# Patient Record
Sex: FEMALE | Race: WHITE | NOT HISPANIC OR LATINO | Employment: FULL TIME | URBAN - METROPOLITAN AREA
[De-identification: names, ages, dates, MRNs, and addresses within clinical notes are randomized per-mention and may not be internally consistent; named-entity substitution may affect disease eponyms.]

---

## 2019-12-09 ENCOUNTER — TRANSCRIBE ORDERS (OUTPATIENT)
Dept: LAB | Facility: HOSPITAL | Age: 29
End: 2019-12-09

## 2019-12-09 ENCOUNTER — LAB (OUTPATIENT)
Dept: LAB | Facility: HOSPITAL | Age: 29
End: 2019-12-09

## 2019-12-09 DIAGNOSIS — B27.90 INFECTIOUS MONONUCLEOSIS WITHOUT COMPLICATION, INFECTIOUS MONONUCLEOSIS DUE TO UNSPECIFIED ORGANISM: Primary | ICD-10-CM

## 2019-12-09 DIAGNOSIS — B27.90 INFECTIOUS MONONUCLEOSIS WITHOUT COMPLICATION, INFECTIOUS MONONUCLEOSIS DUE TO UNSPECIFIED ORGANISM: ICD-10-CM

## 2019-12-09 LAB
DEPRECATED RDW RBC AUTO: 42.4 FL (ref 37–54)
ERYTHROCYTE [DISTWIDTH] IN BLOOD BY AUTOMATED COUNT: 12.6 % (ref 12.3–15.4)
HCT VFR BLD AUTO: 37.7 % (ref 34–46.6)
HETEROPH AB SER QL LA: NEGATIVE
HGB BLD-MCNC: 13 G/DL (ref 12–15.9)
MCH RBC QN AUTO: 31.8 PG (ref 26.6–33)
MCHC RBC AUTO-ENTMCNC: 34.5 G/DL (ref 31.5–35.7)
MCV RBC AUTO: 92.2 FL (ref 79–97)
PLATELET # BLD AUTO: 257 10*3/MM3 (ref 140–450)
PMV BLD AUTO: 11.2 FL (ref 6–12)
RBC # BLD AUTO: 4.09 10*6/MM3 (ref 3.77–5.28)
WBC NRBC COR # BLD: 6.41 10*3/MM3 (ref 3.4–10.8)

## 2019-12-09 PROCEDURE — 36415 COLL VENOUS BLD VENIPUNCTURE: CPT | Performed by: OBSTETRICS & GYNECOLOGY

## 2019-12-09 PROCEDURE — 86308 HETEROPHILE ANTIBODY SCREEN: CPT

## 2019-12-09 PROCEDURE — 85027 COMPLETE CBC AUTOMATED: CPT | Performed by: OBSTETRICS & GYNECOLOGY

## 2021-07-06 ENCOUNTER — TRANSCRIBE ORDERS (OUTPATIENT)
Dept: LAB | Facility: HOSPITAL | Age: 31
End: 2021-07-06

## 2021-07-06 ENCOUNTER — LAB (OUTPATIENT)
Dept: LAB | Facility: HOSPITAL | Age: 31
End: 2021-07-06

## 2021-07-06 DIAGNOSIS — Z00.00 ROUTINE GENERAL MEDICAL EXAMINATION AT A HEALTH CARE FACILITY: ICD-10-CM

## 2021-07-06 DIAGNOSIS — Z00.00 ROUTINE GENERAL MEDICAL EXAMINATION AT A HEALTH CARE FACILITY: Primary | ICD-10-CM

## 2021-07-06 PROCEDURE — 86765 RUBEOLA ANTIBODY: CPT

## 2021-07-06 PROCEDURE — 86735 MUMPS ANTIBODY: CPT

## 2021-07-06 PROCEDURE — 86787 VARICELLA-ZOSTER ANTIBODY: CPT

## 2021-07-06 PROCEDURE — 86762 RUBELLA ANTIBODY: CPT

## 2021-07-06 PROCEDURE — 36415 COLL VENOUS BLD VENIPUNCTURE: CPT

## 2021-07-07 LAB
MEV IGG SER IA-ACNC: >300 AU/ML
MUV IGG SER IA-ACNC: 74.7 AU/ML
RUBV IGG SERPL IA-ACNC: 1.07 INDEX
VZV IGG SER IA-ACNC: <135 INDEX
VZV IGM SER IA-ACNC: <0.91 INDEX (ref 0–0.9)

## 2022-12-19 ENCOUNTER — LAB (OUTPATIENT)
Dept: LAB | Facility: HOSPITAL | Age: 32
End: 2022-12-19

## 2022-12-19 ENCOUNTER — TRANSCRIBE ORDERS (OUTPATIENT)
Dept: LAB | Facility: HOSPITAL | Age: 32
End: 2022-12-19

## 2022-12-19 DIAGNOSIS — Z00.00 ROUTINE GENERAL MEDICAL EXAMINATION AT A HEALTH CARE FACILITY: Primary | ICD-10-CM

## 2022-12-19 DIAGNOSIS — Z00.00 ROUTINE GENERAL MEDICAL EXAMINATION AT A HEALTH CARE FACILITY: ICD-10-CM

## 2022-12-19 LAB
CHOLEST SERPL-MCNC: 166 MG/DL (ref 0–200)
HBA1C MFR BLD: 5.4 % (ref 4.8–5.6)
HDLC SERPL QL: 3.25
HDLC SERPL-MCNC: 51 MG/DL (ref 40–60)
LDLC SERPL CALC-MCNC: 98 MG/DL (ref 0–100)
TRIGL SERPL-MCNC: 93 MG/DL (ref 0–150)
VLDLC SERPL-MCNC: 17 MG/DL (ref 5–40)

## 2022-12-19 PROCEDURE — 80061 LIPID PANEL: CPT

## 2022-12-19 PROCEDURE — 80050 GENERAL HEALTH PANEL: CPT

## 2022-12-19 PROCEDURE — 36415 COLL VENOUS BLD VENIPUNCTURE: CPT

## 2022-12-19 PROCEDURE — 83036 HEMOGLOBIN GLYCOSYLATED A1C: CPT

## 2022-12-20 LAB
ALBUMIN SERPL-MCNC: 4.5 G/DL (ref 3.5–5.2)
ALBUMIN/GLOB SERPL: 1.7 G/DL
ALP SERPL-CCNC: 58 U/L (ref 39–117)
ALT SERPL W P-5'-P-CCNC: 11 U/L (ref 1–33)
ANION GAP SERPL CALCULATED.3IONS-SCNC: 8.5 MMOL/L (ref 5–15)
AST SERPL-CCNC: 13 U/L (ref 1–32)
BILIRUB SERPL-MCNC: 0.5 MG/DL (ref 0–1.2)
BUN SERPL-MCNC: 7 MG/DL (ref 6–20)
BUN/CREAT SERPL: 8.4 (ref 7–25)
CALCIUM SPEC-SCNC: 9.9 MG/DL (ref 8.6–10.5)
CHLORIDE SERPL-SCNC: 103 MMOL/L (ref 98–107)
CO2 SERPL-SCNC: 25.5 MMOL/L (ref 22–29)
CREAT SERPL-MCNC: 0.83 MG/DL (ref 0.57–1)
DEPRECATED RDW RBC AUTO: 41.7 FL (ref 37–54)
EGFRCR SERPLBLD CKD-EPI 2021: 96.2 ML/MIN/1.73
ERYTHROCYTE [DISTWIDTH] IN BLOOD BY AUTOMATED COUNT: 12.3 % (ref 12.3–15.4)
GLOBULIN UR ELPH-MCNC: 2.6 GM/DL
GLUCOSE SERPL-MCNC: 77 MG/DL (ref 65–99)
HCT VFR BLD AUTO: 41 % (ref 34–46.6)
HGB BLD-MCNC: 13.8 G/DL (ref 12–15.9)
MCH RBC QN AUTO: 31 PG (ref 26.6–33)
MCHC RBC AUTO-ENTMCNC: 33.7 G/DL (ref 31.5–35.7)
MCV RBC AUTO: 92.1 FL (ref 79–97)
PLATELET # BLD AUTO: 328 10*3/MM3 (ref 140–450)
PMV BLD AUTO: 10.5 FL (ref 6–12)
POTASSIUM SERPL-SCNC: 4.6 MMOL/L (ref 3.5–5.2)
PROT SERPL-MCNC: 7.1 G/DL (ref 6–8.5)
RBC # BLD AUTO: 4.45 10*6/MM3 (ref 3.77–5.28)
SODIUM SERPL-SCNC: 137 MMOL/L (ref 136–145)
TSH SERPL DL<=0.05 MIU/L-ACNC: 1.09 UIU/ML (ref 0.27–4.2)
WBC NRBC COR # BLD: 8.16 10*3/MM3 (ref 3.4–10.8)

## 2024-12-05 ENCOUNTER — LAB (OUTPATIENT)
Dept: LAB | Facility: HOSPITAL | Age: 34
End: 2024-12-05
Payer: COMMERCIAL

## 2024-12-05 ENCOUNTER — OFFICE VISIT (OUTPATIENT)
Dept: FAMILY MEDICINE CLINIC | Facility: CLINIC | Age: 34
End: 2024-12-05
Payer: COMMERCIAL

## 2024-12-05 VITALS
WEIGHT: 243.8 LBS | DIASTOLIC BLOOD PRESSURE: 82 MMHG | BODY MASS INDEX: 38.27 KG/M2 | OXYGEN SATURATION: 97 % | HEIGHT: 67 IN | HEART RATE: 78 BPM | SYSTOLIC BLOOD PRESSURE: 130 MMHG

## 2024-12-05 DIAGNOSIS — E66.812 CLASS 2 OBESITY WITHOUT SERIOUS COMORBIDITY WITH BODY MASS INDEX (BMI) OF 38.0 TO 38.9 IN ADULT, UNSPECIFIED OBESITY TYPE: ICD-10-CM

## 2024-12-05 DIAGNOSIS — G47.9 SLEEPING DIFFICULTY: ICD-10-CM

## 2024-12-05 DIAGNOSIS — Z00.00 ANNUAL PHYSICAL EXAM: Primary | ICD-10-CM

## 2024-12-05 DIAGNOSIS — Z13.220 SCREENING FOR CHOLESTEROL LEVEL: ICD-10-CM

## 2024-12-05 DIAGNOSIS — Z13.1 SCREENING FOR DIABETES MELLITUS: ICD-10-CM

## 2024-12-05 DIAGNOSIS — F31.9 BIPOLAR 1 DISORDER: ICD-10-CM

## 2024-12-05 DIAGNOSIS — Z00.00 ANNUAL PHYSICAL EXAM: ICD-10-CM

## 2024-12-05 LAB
DEPRECATED RDW RBC AUTO: 38.4 FL (ref 37–54)
ERYTHROCYTE [DISTWIDTH] IN BLOOD BY AUTOMATED COUNT: 11.8 % (ref 12.3–15.4)
HCT VFR BLD AUTO: 39.2 % (ref 34–46.6)
HGB BLD-MCNC: 13.3 G/DL (ref 12–15.9)
MCH RBC QN AUTO: 30.6 PG (ref 26.6–33)
MCHC RBC AUTO-ENTMCNC: 33.9 G/DL (ref 31.5–35.7)
MCV RBC AUTO: 90.1 FL (ref 79–97)
PLATELET # BLD AUTO: 426 10*3/MM3 (ref 140–450)
PMV BLD AUTO: 10.1 FL (ref 6–12)
RBC # BLD AUTO: 4.35 10*6/MM3 (ref 3.77–5.28)
WBC NRBC COR # BLD AUTO: 9.04 10*3/MM3 (ref 3.4–10.8)

## 2024-12-05 PROCEDURE — 99385 PREV VISIT NEW AGE 18-39: CPT | Performed by: STUDENT IN AN ORGANIZED HEALTH CARE EDUCATION/TRAINING PROGRAM

## 2024-12-05 PROCEDURE — 80053 COMPREHEN METABOLIC PANEL: CPT

## 2024-12-05 PROCEDURE — 80061 LIPID PANEL: CPT

## 2024-12-05 PROCEDURE — 85027 COMPLETE CBC AUTOMATED: CPT

## 2024-12-05 PROCEDURE — 84443 ASSAY THYROID STIM HORMONE: CPT

## 2024-12-05 PROCEDURE — 83036 HEMOGLOBIN GLYCOSYLATED A1C: CPT

## 2024-12-05 RX ORDER — LISDEXAMFETAMINE DIMESYLATE 50 MG/1
50 CAPSULE ORAL
COMMUNITY
Start: 2024-10-24

## 2024-12-05 RX ORDER — VALACYCLOVIR HYDROCHLORIDE 500 MG/1
500 TABLET, FILM COATED ORAL
COMMUNITY

## 2024-12-05 RX ORDER — LITHIUM CARBONATE 300 MG
300 TABLET ORAL
COMMUNITY
Start: 2024-11-08

## 2024-12-05 RX ORDER — NORETHINDRONE 0.35 MG/1
1 TABLET ORAL DAILY
COMMUNITY

## 2024-12-05 RX ORDER — BUPROPION HYDROCHLORIDE 300 MG/1
TABLET ORAL
COMMUNITY
Start: 2024-11-15

## 2024-12-05 RX ORDER — HYDROXYZINE HYDROCHLORIDE 25 MG/1
50 TABLET, FILM COATED ORAL EVERY 24 HOURS
COMMUNITY
End: 2024-12-05

## 2024-12-05 RX ORDER — LITHIUM CARBONATE 150 MG/1
150 CAPSULE ORAL
COMMUNITY
Start: 2024-11-08

## 2024-12-05 NOTE — PROGRESS NOTES
Physical Exam     Patient Name: Darrius Katz  : 1990   MRN: [id number]   Care Team: Patient Care Team:  Taylor Cope DO as PCP - General (Internal Medicine)    Chief Complaint:    Chief Complaint   Patient presents with    Annual Exam       History of Present Illness: Darrius Katz is a 34 y.o. female who is presents today for annual healthcare maintenance. She is a pharmacy tech. She is in school, studying psychology.    Bipolar disorder - Following with Rebecca Brunner for behavioral health needs. Compliant with wellbutrin, lithium.    ADHD - Vyvanse 50mg daily, also managed by behavioral health    HSV1 - taking valtrex prn     Obesity - reports 65lb weight gain over the past year. She attributes this to last years decline in mental health, lack of exercise. She feels like her diet is up and down. She enjoys healthy eating but feels she eats many meal out of convenience lately.     Depression: PHQ-2 Depression Screening  PHQ-2 Depression Screening  Little interest or pleasure in doing things? Not at all   Feeling down, depressed, or hopeless? Not at all   PHQ-2 Total Score 0      Family History  Mother - no known medical history  Father - no known medical history  Maternal grandmother - breast cancer  Maternal grandfather - lung cancer       Health Maintenance   Topic Date Due    COVID-19 Vaccine (2024-25 season) 2024 (Originally 2024)    INFLUENZA VACCINE  2025 (Originally 2024)    ANNUAL PHYSICAL  2025    BMI FOLLOWUP  2025    PAP SMEAR  2026    TDAP/TD VACCINES (3 - Td or Tdap) 2031    HEPATITIS C SCREENING  Completed    Pneumococcal Vaccine 0-64  Aged Out       Subjective      Review of Systems:   Review of Systems - See HPI    Past Medical History: History reviewed. No pertinent past medical history.    Past Surgical History: History reviewed. No pertinent surgical history.    Family History: History reviewed. No pertinent family  "history.    Social History:   Social History     Socioeconomic History    Marital status: Single   Tobacco Use    Smoking status: Never    Smokeless tobacco: Never   Vaping Use    Vaping status: Never Used   Substance and Sexual Activity    Alcohol use: Yes    Drug use: Never    Sexual activity: Defer       Tobacco History:   Social History     Tobacco Use   Smoking Status Never   Smokeless Tobacco Never       Medications:     Current Outpatient Medications:     buPROPion XL (WELLBUTRIN XL) 300 MG 24 hr tablet, , Disp: , Rfl:     Angy 0.35 MG tablet, Take 1 tablet by mouth Daily., Disp: , Rfl:     lisdexamfetamine (VYVANSE) 50 MG capsule, Take 1 capsule by mouth, Disp: , Rfl:     lithium 300 MG tablet, Take 1 tablet by mouth., Disp: , Rfl:     lithium carbonate 150 MG capsule, 1 capsule., Disp: , Rfl:     valACYclovir (VALTREX) 500 MG tablet, 1 tablet., Disp: , Rfl:     Allergies:   Allergies   Allergen Reactions    Sulfa Antibiotics Rash       Past Medical History, Social History, Family History and Care Team were all reviewed with patient and updated as appropriate.       Objective     Physical Exam:  Vital Signs:   Vitals:    12/05/24 1245   BP: 130/82   Pulse: 78   SpO2: 97%   Weight: 111 kg (243 lb 12.8 oz)   Height: 170.2 cm (67\")     Body mass index is 38.18 kg/m².     Physical Exam  Vitals reviewed.   Constitutional:       Appearance: Normal appearance. She is not ill-appearing.   Cardiovascular:      Rate and Rhythm: Normal rate and regular rhythm.      Heart sounds: Normal heart sounds. No murmur heard.  Pulmonary:      Effort: Pulmonary effort is normal. No respiratory distress.      Breath sounds: Normal breath sounds. No wheezing.   Abdominal:      General: Abdomen is flat. There is no distension.      Palpations: Abdomen is soft.      Tenderness: There is no abdominal tenderness.   Skin:     General: Skin is warm and dry.   Neurological:      Mental Status: She is alert.   Psychiatric:         Mood " and Affect: Mood normal.         Behavior: Behavior normal.         Judgment: Judgment normal.         Assessment / Plan      Assessment/Plan:   Problems Addressed This Visit  Diagnoses and all orders for this visit:    1. Annual physical exam (Primary)  -     CBC (No Diff); Future  -     Lipid Panel; Future  -     Hemoglobin A1c; Future  -     Comprehensive Metabolic Panel; Future  -     TSH; Future    2. Screening for diabetes mellitus  -     Hemoglobin A1c; Future    3. Screening for cholesterol level  -     Lipid Panel; Future    4. Class 2 obesity without serious comorbidity with body mass index (BMI) of 38.0 to 38.9 in adult, unspecified obesity type  -     CBC (No Diff); Future  -     Lipid Panel; Future  -     Hemoglobin A1c; Future  -     Comprehensive Metabolic Panel; Future  -     TSH; Future    5. Sleeping difficulty    6. Bipolar 1 disorder  -     Ambulatory Referral to Behavioral Health        Healthcare Maintenance   Vaccines:  Declines covid and influenza    Cancer Screening  Pap - 12/2023 [facility] - follows with Dr. Evonne aMgaña       Other  -PHQ score   -Counseled on importance of maintaining healthy diet and routine exercise. Encouraged 150 min exercise per week.  -Tobacco cessation: not indicated, nonsmoker  -Sexual Health: OCP managed by GYN for contraception. Declines STD screening.   -Blood pressure is at goal <130/80  -Metabolic screening today      Encouraged routine eye and dental exams.     ADHD - managed through behavioral health, vyvanse  Bipolar - managed through behavioral health, wellbutrin and lithium  Anxiety/sleep difficulties - previously taking hydroxyzine which has not been helpful. She has trouble staying asleep. She is planning to follow up with her psychiatrist about switching her sleep medication, interested in trazodone. Interested in talk therapy and we will put in referral today.     Obesity - Counseled patient on importance of weight loss and maintaining healthy lifestyle.  Recommended calorie deficit and 150 minutes of exercise per week.      Plan of care reviewed with patient at the conclusion of today's visit. Education was provided regarding diagnosis and management.  Patient verbalizes understanding of and agreement with management plan.    Follow Up:   Return in about 6 weeks (around 1/16/2025) for Recheck obesity - in person or video, patient preference .        DO ANNY Corley RD  [facility]  [address]  [facility] [address]  Fax [phone]  Phone [phone]

## 2024-12-06 LAB
ALBUMIN SERPL-MCNC: 4.3 G/DL (ref 3.5–5.2)
ALBUMIN/GLOB SERPL: 1.7 G/DL
ALP SERPL-CCNC: 79 U/L (ref 39–117)
ALT SERPL W P-5'-P-CCNC: 8 U/L (ref 1–33)
ANION GAP SERPL CALCULATED.3IONS-SCNC: 8 MMOL/L (ref 5–15)
AST SERPL-CCNC: 15 U/L (ref 1–32)
BILIRUB SERPL-MCNC: 0.3 MG/DL (ref 0–1.2)
BUN SERPL-MCNC: 8 MG/DL (ref 6–20)
BUN/CREAT SERPL: 8.2 (ref 7–25)
CALCIUM SPEC-SCNC: 9.7 MG/DL (ref 8.6–10.5)
CHLORIDE SERPL-SCNC: 105 MMOL/L (ref 98–107)
CHOLEST SERPL-MCNC: 196 MG/DL (ref 0–200)
CO2 SERPL-SCNC: 23 MMOL/L (ref 22–29)
CREAT SERPL-MCNC: 0.97 MG/DL (ref 0.57–1)
EGFRCR SERPLBLD CKD-EPI 2021: 78.8 ML/MIN/1.73
GLOBULIN UR ELPH-MCNC: 2.6 GM/DL
GLUCOSE SERPL-MCNC: 67 MG/DL (ref 65–99)
HBA1C MFR BLD: 5.1 % (ref 4.8–5.6)
HDLC SERPL-MCNC: 41 MG/DL (ref 40–60)
LDLC SERPL CALC-MCNC: 145 MG/DL (ref 0–100)
LDLC/HDLC SERPL: 3.52 {RATIO}
POTASSIUM SERPL-SCNC: 4.7 MMOL/L (ref 3.5–5.2)
PROT SERPL-MCNC: 6.9 G/DL (ref 6–8.5)
SODIUM SERPL-SCNC: 136 MMOL/L (ref 136–145)
TRIGL SERPL-MCNC: 53 MG/DL (ref 0–150)
TSH SERPL DL<=0.05 MIU/L-ACNC: 2.95 UIU/ML (ref 0.27–4.2)
VLDLC SERPL-MCNC: 10 MG/DL (ref 5–40)

## 2024-12-13 ENCOUNTER — PATIENT ROUNDING (BHMG ONLY) (OUTPATIENT)
Dept: FAMILY MEDICINE CLINIC | Facility: CLINIC | Age: 34
End: 2024-12-13
Payer: COMMERCIAL

## 2025-01-16 ENCOUNTER — OFFICE VISIT (OUTPATIENT)
Dept: FAMILY MEDICINE CLINIC | Facility: CLINIC | Age: 35
End: 2025-01-16
Payer: COMMERCIAL

## 2025-01-16 VITALS
SYSTOLIC BLOOD PRESSURE: 134 MMHG | HEART RATE: 85 BPM | WEIGHT: 233.4 LBS | DIASTOLIC BLOOD PRESSURE: 86 MMHG | OXYGEN SATURATION: 95 % | HEIGHT: 67 IN | BODY MASS INDEX: 36.63 KG/M2

## 2025-01-16 DIAGNOSIS — M54.42 CHRONIC LEFT-SIDED LOW BACK PAIN WITH BILATERAL SCIATICA: ICD-10-CM

## 2025-01-16 DIAGNOSIS — G89.29 CHRONIC LEFT-SIDED LOW BACK PAIN WITH BILATERAL SCIATICA: ICD-10-CM

## 2025-01-16 DIAGNOSIS — M54.41 CHRONIC LEFT-SIDED LOW BACK PAIN WITH BILATERAL SCIATICA: ICD-10-CM

## 2025-01-16 DIAGNOSIS — E66.812 CLASS 2 OBESITY WITHOUT SERIOUS COMORBIDITY WITH BODY MASS INDEX (BMI) OF 38.0 TO 38.9 IN ADULT, UNSPECIFIED OBESITY TYPE: Primary | ICD-10-CM

## 2025-01-16 PROCEDURE — 99214 OFFICE O/P EST MOD 30 MIN: CPT | Performed by: STUDENT IN AN ORGANIZED HEALTH CARE EDUCATION/TRAINING PROGRAM

## 2025-01-16 RX ORDER — MELOXICAM 15 MG/1
15 TABLET ORAL DAILY PRN
Qty: 30 TABLET | Refills: 0 | Status: SHIPPED | OUTPATIENT
Start: 2025-01-16

## 2025-01-16 NOTE — PROGRESS NOTES
Established Office Visit      Patient Name: Darrius Katz  : 1990   MRN: [id number]   Care Team: Patient Care Team:  Taylor Cope DO as PCP - General (Internal Medicine)    Chief Complaint:    Chief Complaint   Patient presents with    Obesity       History of Present Illness: Darrius Katz is a 34 y.o. female who is here today to follow up with obesity.    Over the past 6 weeks she has focused on dietary changes - limiting sugar, increasing protein, trying to incorporate more lean meats. Has lost 10lb in the past month.    She is eager to start working out - she enjoys lifting weights.     She reports left sided low back pain with sciatica which has been flaring over the past week or so. Aleve is partially helpful. No specific trigger. She has felt similar pain in the past and feels it flare every now and then. Typically responsive to NSAIDs but hasn't has as much luck with OTC aleve this time. Interested in PT.    Subjective      Review of Systems:   Review of Systems - See HPI    I have reviewed and the following portions of the patient's history were updated as appropriate: past family history, past medical history, past social history, past surgical history and problem list.    Medications:     Current Outpatient Medications:     buPROPion XL (WELLBUTRIN XL) 300 MG 24 hr tablet, , Disp: , Rfl:     Angy 0.35 MG tablet, Take 1 tablet by mouth Daily., Disp: , Rfl:     lisdexamfetamine (VYVANSE) 50 MG capsule, Take 1 capsule by mouth, Disp: , Rfl:     lithium 300 MG tablet, Take 1 tablet by mouth., Disp: , Rfl:     lithium carbonate 150 MG capsule, 1 capsule., Disp: , Rfl:     valACYclovir (VALTREX) 500 MG tablet, 1 tablet., Disp: , Rfl:     meloxicam (MOBIC) 15 MG tablet, Take 1 tablet by mouth Daily As Needed for Mild Pain (low back pain with left sciatica)., Disp: 30 tablet, Rfl: 0    Allergies:   Allergies   Allergen Reactions    Sulfa Antibiotics Rash       Objective     Physical  "Exam:  Vital Signs:   Vitals:    01/16/25 0951   BP: 134/86   Pulse: 85   SpO2: 95%   Weight: 106 kg (233 lb 6.4 oz)   Height: 170.2 cm (67\")     Body mass index is 36.56 kg/m².     Physical Exam  Vitals reviewed.   Constitutional:       Appearance: Normal appearance. She is obese.   Cardiovascular:      Rate and Rhythm: Normal rate.   Pulmonary:      Effort: Pulmonary effort is normal. No respiratory distress.   Skin:     General: Skin is warm and dry.   Neurological:      Mental Status: She is alert.   Psychiatric:         Mood and Affect: Mood normal.         Behavior: Behavior normal.         Judgment: Judgment normal.         Assessment / Plan      Assessment/Plan:   Problems Addressed This Visit  Diagnoses and all orders for this visit:    1. Class 2 obesity without serious comorbidity with body mass index (BMI) of 38.0 to 38.9 in adult, unspecified obesity type (Primary)    2. Chronic left-sided low back pain with bilateral sciatica  -     meloxicam (MOBIC) 15 MG tablet; Take 1 tablet by mouth Daily As Needed for Mild Pain (low back pain with left sciatica).  Dispense: 30 tablet; Refill: 0  -     Ambulatory Referral to Physical Therapy for Evaluation & Treatment      Obesity - improving, has lost 10lb in the last month due to dietary changes - encouraged her to stay consistent with this and add goal of incorporating exercise into her routine.     Low back pain with left sided sciatica - trial of meloxicam and referred to PT. Will let me know if pain persists. Weight loss should also help prevent future flares as well.       Plan of care reviewed with patient at the conclusion of today's visit. Education was provided regarding diagnosis and management.  Patient verbalizes understanding of and agreement with management plan.    Follow Up:   Return in about 5 months (around 6/16/2025) for Recheck obesity and HLD .        DO ANNY Corley RD  [facility] " [facility]  [address] [facility] [place] [address]  Fax [phone]  Phone [phone]

## 2025-01-25 ENCOUNTER — TELEMEDICINE (OUTPATIENT)
Dept: FAMILY MEDICINE CLINIC | Facility: TELEHEALTH | Age: 35
End: 2025-01-25
Payer: COMMERCIAL

## 2025-01-25 DIAGNOSIS — J06.9 UPPER RESPIRATORY TRACT INFECTION, UNSPECIFIED TYPE: Primary | ICD-10-CM

## 2025-01-25 PROCEDURE — 99213 OFFICE O/P EST LOW 20 MIN: CPT | Performed by: NURSE PRACTITIONER

## 2025-01-25 RX ORDER — BROMPHENIRAMINE MALEATE, PSEUDOEPHEDRINE HYDROCHLORIDE, AND DEXTROMETHORPHAN HYDROBROMIDE 2; 30; 10 MG/5ML; MG/5ML; MG/5ML
5 SYRUP ORAL 3 TIMES DAILY PRN
Qty: 120 ML | Refills: 0 | Status: SHIPPED | OUTPATIENT
Start: 2025-01-25

## 2025-01-25 RX ORDER — HYDROXYZINE HYDROCHLORIDE 50 MG/1
TABLET, FILM COATED ORAL
COMMUNITY
Start: 2025-01-16

## 2025-01-25 RX ORDER — DEXTROMETHORPHAN HYDROBROMIDE AND PROMETHAZINE HYDROCHLORIDE 15; 6.25 MG/5ML; MG/5ML
5 SYRUP ORAL NIGHTLY PRN
Qty: 120 ML | Refills: 0 | Status: SHIPPED | OUTPATIENT
Start: 2025-01-25

## 2025-01-25 RX ORDER — CLONIDINE HYDROCHLORIDE 0.1 MG/1
TABLET ORAL
COMMUNITY
Start: 2025-01-19

## 2025-01-25 NOTE — PATIENT INSTRUCTIONS
Drink plenty of water  Over the counter pain relievers okay   If symptoms do not improve in 3-5 days follow up with your primary care provider or urgent care  If symptoms worsen follow up with urgent care or the emergency room      Upper Respiratory Infection, Adult  An upper respiratory infection (URI) is a common viral infection of the nose, throat, and upper air passages that lead to the lungs. The most common type of URI is the common cold. URIs usually get better on their own, without medical treatment.  What are the causes?  A URI is caused by a virus. You may catch a virus by:  Breathing in droplets from an infected person's cough or sneeze.  Touching something that has been exposed to the virus (is contaminated) and then touching your mouth, nose, or eyes.  What increases the risk?  You are more likely to get a URI if:  You are very young or very old.  You have close contact with others, such as at work, school, or a health care facility.  You smoke.  You have long-term (chronic) heart or lung disease.  You have a weakened disease-fighting system (immune system).  You have nasal allergies or asthma.  You are experiencing a lot of stress.  You have poor nutrition.  What are the signs or symptoms?  A URI usually involves some of the following symptoms:  Runny or stuffy (congested) nose.  Cough.  Sneezing.  Sore throat.  Headache.  Fatigue.  Fever.  Loss of appetite.  Pain in your forehead, behind your eyes, and over your cheekbones (sinus pain).  Muscle aches.  Redness or irritation of the eyes.  Pressure in the ears or face.  How is this diagnosed?  This condition may be diagnosed based on your medical history and symptoms, and a physical exam. Your health care provider may use a swab to take a mucus sample from your nose (nasal swab). This sample can be tested to determine what virus is causing the illness.  How is this treated?  URIs usually get better on their own within 7-10 days. Medicines cannot cure  URIs, but your health care provider may recommend certain medicines to help relieve symptoms, such as:  Over-the-counter cold medicines.  Cough suppressants. Coughing is a type of defense against infection that helps to clear the respiratory system, so take these medicines only as recommended by your health care provider.  Fever-reducing medicines.  Follow these instructions at home:  Activity  Rest as needed.  If you have a fever, stay home from work or school until your fever is gone or until your health care provider says your URI cannot spread to other people (is no longer contagious). Your health care provider may have you wear a face mask to prevent your infection from spreading.  Relieving symptoms  Gargle with a mixture of salt and water 3-4 times a day or as needed. To make salt water, completely dissolve ½-1 tsp (3-6 g) of salt in 1 cup (237 mL) of warm water.  Use a cool-mist humidifier to add moisture to the air. This can help you breathe more easily.  Eating and drinking    Drink enough fluid to keep your urine pale yellow.  Eat soups and other clear broths.  General instructions    Take over-the-counter and prescription medicines only as told by your health care provider. These include cold medicines, fever reducers, and cough suppressants.  Do not use any products that contain nicotine or tobacco. These products include cigarettes, chewing tobacco, and vaping devices, such as e-cigarettes. If you need help quitting, ask your health care provider.  Stay away from secondhand smoke.  Stay up to date on all immunizations, including the yearly (annual) flu vaccine.  Keep all follow-up visits. This is important.  How to prevent the spread of infection to others  URIs can be contagious. To prevent the infection from spreading:  Wash your hands with soap and water for at least 20 seconds. If soap and water are not available, use hand .  Avoid touching your mouth, face, eyes, or nose.  Cough or sneeze  into a tissue or your sleeve or elbow instead of into your hand or into the air.    Contact a health care provider if:  You are getting worse instead of better.  You have a fever or chills.  Your mucus is brown or red.  You have yellow or brown discharge coming from your nose.  You have pain in your face, especially when you bend forward.  You have swollen neck glands.  You have pain while swallowing.  You have white areas in the back of your throat.  Get help right away if:  You have shortness of breath that gets worse.  You have severe or persistent:  Headache.  Ear pain.  Sinus pain.  Chest pain.  You have chronic lung disease along with any of the following:  Making high-pitched whistling sounds when you breathe, most often when you breathe out (wheezing).  Prolonged cough (more than 14 days).  Coughing up blood.  A change in your usual mucus.  You have a stiff neck.  You have changes in your:  Vision.  Hearing.  Thinking.  Mood.  These symptoms may be an emergency. Get help right away. Call 911.  Do not wait to see if the symptoms will go away.  Do not drive yourself to the hospital.  Summary  An upper respiratory infection (URI) is a common infection of the nose, throat, and upper air passages that lead to the lungs.  A URI is caused by a virus.  URIs usually get better on their own within 7-10 days.  Medicines cannot cure URIs, but your health care provider may recommend certain medicines to help relieve symptoms.  This information is not intended to replace advice given to you by your health care provider. Make sure you discuss any questions you have with your health care provider.  Document Revised: 07/20/2022 Document Reviewed: 07/20/2022  Elsevier Patient Education © 2024 [employer]

## 2025-01-25 NOTE — PROGRESS NOTES
CHIEF COMPLAINT  Chief Complaint   Patient presents with    Sinusitis         HPI  Darrius Katz is a 34 y.o. female  presents with complaint of congestion, cough and fatigue that started on Thursday.   She has not had her flu vaccine.     Review of Systems   Constitutional:  Positive for fatigue. Negative for chills, diaphoresis and fever.   HENT:  Positive for congestion and rhinorrhea. Negative for postnasal drip, sinus pressure, sinus pain, sneezing and sore throat.    Respiratory:  Positive for cough. Negative for chest tightness, shortness of breath and wheezing.    Gastrointestinal:  Negative for diarrhea, nausea and vomiting.   Musculoskeletal:  Positive for myalgias.   Neurological:  Positive for headaches.       Past Medical History:   Diagnosis Date    ADHD (attention deficit hyperactivity disorder) 2006    Anxiety 2021    First psychotic episode    Depression 2024       Family History   Problem Relation Age of Onset    Cancer Maternal Grandfather     Alcohol abuse Paternal Grandfather     Diabetes Paternal Grandfather     Cancer Maternal Aunt        Social History     Socioeconomic History    Marital status: Single   Tobacco Use    Smoking status: Never     Passive exposure: Never    Smokeless tobacco: Never   Vaping Use    Vaping status: Never Used   Substance and Sexual Activity    Alcohol use: Yes    Drug use: Never    Sexual activity: Not Currently     Partners: Male     Birth control/protection: Birth control pill         LMP 12/26/2024 (Approximate)   Breastfeeding No     PHYSICAL EXAM  Physical Exam   Constitutional: She is oriented to person, place, and time. She appears well-developed and well-nourished. She does not have a sickly appearance. She does not appear ill. No distress.   HENT:   Head: Normocephalic and atraumatic.   Eyes: EOM are normal.   Neck: Neck normal appearance.  Pulmonary/Chest: Effort normal.  No respiratory distress.  Neurological: She is alert and oriented to person,  place, and time.   Skin: Skin is dry.   Psychiatric: She has a normal mood and affect.           Diagnoses and all orders for this visit:    1. Upper respiratory tract infection, unspecified type (Primary)    Other orders  -     brompheniramine-pseudoephedrine-DM 30-2-10 MG/5ML syrup; Take 5 mL by mouth 3 (Three) Times a Day As Needed for Congestion or Cough.  Dispense: 120 mL; Refill: 0  -     promethazine-dextromethorphan (PROMETHAZINE-DM) 6.25-15 MG/5ML syrup; Take 5 mL by mouth At Night As Needed for Cough.  Dispense: 120 mL; Refill: 0        Mode of visit: Video   Myself and Darrius Katz participated in this visit. The patient is located in [address] [place] [address]. I am located in [place], [place]. Mychart and Twilio were utilized.   You have chosen to receive care through a telehealth visit.     Does the patient consent to use a video/audio connection for your medical care today? Yes       Note Disclaimer: At [facility], we believe that sharing information builds trust and better   relationships. You are receiving this note because you recently visited [facility]. It is possible you   will see health information before a provider has talked with you about it. This kind of information can   be easy to misunderstand. To help you fully understand what it means for your health, we urge you to   discuss this note with your provider.    Ema Lamb, JONATHAN  01/25/2025  13:20 EST

## 2025-03-06 ENCOUNTER — TREATMENT (OUTPATIENT)
Dept: PHYSICAL THERAPY | Facility: CLINIC | Age: 35
End: 2025-03-06
Payer: COMMERCIAL

## 2025-03-06 DIAGNOSIS — M54.42 CHRONIC BILATERAL LOW BACK PAIN WITH BILATERAL SCIATICA: Primary | ICD-10-CM

## 2025-03-06 DIAGNOSIS — M54.41 CHRONIC BILATERAL LOW BACK PAIN WITH BILATERAL SCIATICA: Primary | ICD-10-CM

## 2025-03-06 DIAGNOSIS — G89.29 CHRONIC BILATERAL LOW BACK PAIN WITH BILATERAL SCIATICA: Primary | ICD-10-CM

## 2025-03-06 NOTE — PROGRESS NOTES
Physical Therapy Initial Evaluation and Plan of Care    [facility] [place]  [address]  [place] [id number] [phone]    Patient: Darrius Katz   : 1990  Diagnosis/ICD-10 Code:  No primary diagnosis found.  Referring practitioner: Taylor Cope DO    Subjective Evaluation    History of Present Illness    Subjective comment: Has had chronic low back that is bilateral and radiates up and down on both sides.  Has lateral left hip/pelvic region pain.  Had one incidence of right anterior thigh numbness andt tingling one month ago.  That was an isolated incident.  Noticed clicking in the low back about 6 months ago.  Left lateral hip pain is constant.  Right anterior hip pain is intermittent. (Lost 25 lbs total and has a goal of getting to 185-190 lbs.)  Patient Occupation: [employer]   Precautions and Work Restrictions: noneQuality of life: excellent    Pain  Alleviating factors: Naprosyn; days off of work are better; sitting.  Exacerbated by: Standing; S/L sleep; can take 1 hr to get to sleep; prone (increased pressure on low back);  Progression: worsening    Social Support  Lives with: alone    Treatments  Current treatment: medication  Patient Goals  Patient goals for therapy: decreased pain and increased strength  Patient goal: Transition to gym routine.       *TIKA:  24%    Objective          Neurological Testing     Reflexes   Left   Patellar (L4): normal (2+)  Achilles (S1): normal (2+)    Right   Patellar (L4): normal (2+)  Achilles (S1): normal (2+)    Active Range of Motion     Lumbar   Flexion: WFL  Extension: WFL  Left lateral flexion: WFL  Right lateral flexion: WFL  Left rotation: WFL  Right rotation: WFL    Strength/Myotome Testing     Left Hip   Planes of Motion   Flexion: 4  Abduction: 4-    Right Hip   Planes of Motion   Flexion: 4-  Abduction: 4    Additional Strength Details  *Trunk flx:  3-/5    Tests  Pt arrived by POV with complaint of hypertension. Pt had a Virtual visit with his PMD this morning, pt had reported not feeling well starting 5 days ago and was started on  Prednisone and amoxicillin, pt has a history of COPD. During pts virtual visit pt had reported to his PMD that his blood pressure was elevated and has concerns for Covid. Pt arrived awake alert and oriented x 4.   Pt educated on Er flow       Thoracic   Negative slump.           Assessment & Plan       Assessment  Impairments: activity intolerance, impaired physical strength, lacks appropriate home exercise program and pain with function   Functional limitations: sleeping, walking, uncomfortable because of pain, standing and unable to perform repetitive tasks   Assessment details: Ms. Katz is a very pleasant 34 year old female that presents to physical therapy with chronic bilateral lumbar spine region pain.  Radiation into the left lateral hip and right anterior hip regions.  Symptoms started insidiously about 7 years ago.  PMH was covered and reviewed during interview.  Neurological exam is unremarkable.  Trunk mobility is within normal limits in all planes without pain.  No strength deficits are in the anterior trunk muscles.  Also, has moderate hip abduction weakness bilaterally.  Needs focus care on improving trunk and proximal hip strength.  Slump testing was negative for reproduction of low back and bilateral hip pain.  No suspicion of radiculopathic symptoms at this time.  Prognosis: good    Goals  Plan Goals: STGs:  1.)  TIKA improved by 1M ALONSO in 6 weeks.  2.)  Have 4+/5 hip abduction strength bilaterally in 6 weeks.  LTGs:  1.)  Have at least 4+/5 trunk flexion strength in 8 weeks.  2.)  Have no sleep disturbance nor difficulty getting to sleep in 8 weeks.  3.)  Return to gym routine without pain or functional limitations in 12 weeks.    Plan  Therapy options: will be seen for skilled therapy services  Planned modality interventions: thermotherapy (hydrocollator packs), dry needling and high voltage pulsed current (pain management)  Planned therapy interventions: therapeutic activities, stretching, strengthening, manual therapy, abdominal trunk stabilization, balance/weight-bearing training, functional ROM exercises and home exercise program  Frequency: 2x month  Duration in visits: 6  Duration in weeks: 12  Treatment plan discussed with:  patient        Manual Therapy:         mins  71187;  Therapeutic Exercise:    14     mins  40827;     Neuromuscular Jeni:        mins  93253;    Therapeutic Activity:     10     mins  49096;     Gait Training:           mins  93416;     Ultrasound:          mins  40172;    Electrical Stimulation:         mins  44562 ( );  Dry Needling          mins self-pay    Timed Treatment:   24   mins   Total Treatment:     55   mins    PT SIGNATURE: Bryon Lundy, PT   DATE TREATMENT INITIATED: 3/6/2025    Initial Certification  Certification Period: 6/4/2025  I certify that the therapy services are furnished while this patient is under my care.  The services outlined above are required by this patient, and will be reviewed every 90 days.     PHYSICIAN: Taylor Cope DO  NPI: [id number]                                      DATE:    Please sign and return via fax to [phone].. Thank you, [facility].

## 2025-03-20 ENCOUNTER — OFFICE VISIT (OUTPATIENT)
Age: 35
End: 2025-03-20
Payer: COMMERCIAL

## 2025-03-20 DIAGNOSIS — F31.30 BIPOLAR I DISORDER, MOST RECENT EPISODE DEPRESSED: Primary | ICD-10-CM

## 2025-03-20 NOTE — PROGRESS NOTES
Initial Assessment Note     Date: 03/20/2025   Client Name: Darrius Katz  MRN: [id number]  Start Time: 1:58 PM  End Time: 3:01 PM    Diagnoses and all orders for this visit:    1. Bipolar I disorder, most recent episode depressed (Primary)         Active Symptoms/Chief Complainant:   Negative self-talk, anxious thought patterns, anhedonia, excessive worry    Reported History     Hx of Presenting problem:   Client reported seeking services today due to previous diagnosis of Bipolar I Disorder, which client is currently managing with the use of medication.  Client reported experiencing 2 prior manic episodes with psychotic features.  Client experienced first episode of chayito in July 2021 and was hospitalized at [facility].  Client maintained medication management with Zyprexa following discharge, but discontinued medication after 6 months due to negative side effects.  Client reported second manic episode occurring in October 2023.  Client received inpatient treatment at [facility] and has maintained medication management since discharge.  Client currently prescribed lithium and bupropion by psychiatric APRN.  Client reported both episodes of chayito were triggered by increased levels of stress and has been maintaining healthy sleep patterns, healthy routines, and medication management in order to manage symptoms.  Client reported that episodes of chayito are characterized by impulsivity, increased spending, reckless driving, destruction of property, lack of sleep, rapid speech, erratic thinking, decreased appetite, flight of ideas, grandiosity, and psychosis symptoms.  Client reported experiencing a depressive episode after second manic episode while prescribed Zyprexa.  Client reported that prescriber switched medications to lithium and bupropion, which have improved depressive symptoms.  Client reported experiencing depressive symptoms of anhedonia, decreased energy, and negative  "self-talk.  Client reported experiencing feelings of worry about the possibility of experiencing another manic episode.  Client reported history of alcohol use disorder, which is currently in remission.  Client reported that she had her first period of sobriety starting in 2019, but had a relapse after her first manic episode.  Client reported 2 years of sobriety as of December 2024.  Client reported that she is active in a 12-step program and has an AA sponsor.    Goals for treatment:   \" Psychiatric APRN trying to figure out me.\"     Trauma Assessment:   Client reported a HX of trauma, which includes 2 previous episodes of chayito with psychotic features that have resulted in hospitalization and the divorce of her parents when client was 17/18 years old.    Family HX of Mental Health Conditions:   Client reported no known family history of mental health conditions.    Previous Treatment HX/MH Hospitalizations:   Client reported history of outpatient therapy, with most recent participation in outpatient therapy occurring in 2022/23.  Client currently receives medication management with a psychiatric APRN and is prescribed lithium and bupropion.    Legal History:  Client reported no legal history.    Employment:   currently employed    Education:   Is the client currently enrolled or attending an education or vocation program?yes     PHQ-9  Little interest or pleasure in doing things? Not at all   Feeling down, depressed, or hopeless? Not at all   PHQ-2 Total Score 0   Trouble falling or staying asleep, or sleeping too much? Nearly every day   Feeling tired or having little energy? Nearly every day   Poor appetite or overeating? Several days   Feeling bad about yourself - or that you are a failure or have let yourself or your family down? Several days   Trouble concentrating on things, such as reading the newspaper or watching television? More than half the days   Moving or speaking so slowly that other people could " "have noticed? Or the opposite - being so fidgety or restless that you have been moving around a lot more than usual? Not at all     Thoughts that you would be better off dead, or of hurting yourself in some way? Not at all   PHQ-9 Total Score 10   If you checked off any problems, how difficult have these problems made it for you to do your work, take care of things at home, or get along with other people? Somewhat difficult           SHAHID-7  SHAHID 7 Total Score: 13       Mental Status Exam  MENTAL STATUS EXAM   General Appearance:  Cleanly groomed and dressed  Eye Contact:  Good eye contact  Attitude:  Cooperative  Motor Activity:  Normal gait, posture  Speech:  Normal rate, tone, volume  Mood and affect:  Appropriate, mood congruent and euthymic  Thought Process:  Logical and linear  Associations/ Thought Content:  No delusions  Hallucinations:  None  Suicidal Ideations:  Not present  Homicidal Ideation:  Not present  Sensorium:  Alert and clear  Orientation:  Person, place and time  Fund of Knowledge:  Appropriate for age and educational level  Insight:  Good  Judgement:  Good  Reliability:  Good          Support System and Protective Factors     Client reported having the following support system:   Mother, father, 3 brothers, sister, AA sponsor, and others in the AA community    Does Client have a responsibility to care for children or pets at this time?   Client reported responsibility to care for her dog.    Client reported the following current coping skills:   Client identified current coping skills as distraction (eating or watching Netflix).  Client reported other previous coping skills as going to the gym or writing in her gratitude or prayer journals.    Does Client have plans for the future that extend beyond one week?   Client verbalized future plans extending beyond 1 week.    Can Client provide a reason for living?   \"Being that kind, helpful voice and what can be a dark and depressing world.  Be " "conscientious of others and tried to be the best version of me.\"    Does Client feel safe in their living environment?     Client reported they feel safe in current living environment.    Services Client is Seeking:  Psychotherapy     [place] Suicide Severity Rating (C-SSRS)  In the past month, have you wished you were dead or wished you could go to sleep and not wake up? No     In the past month, have you actually had any thoughts of killing yourself? No     Have you been thinking about how you might do this? N/A     Have you had these thoughts and had some intention of acting on them? N/A   Have you started to work out or have you worked out the details of how to kill yourself? N/A   Have you ever in your lifetime done anything, started to do anything, or prepared to do anything to end your life? No       Was this within the past three months? N/A     Level of Risk per Screen Low        Substance use  Client reported history of alcohol misuse, but stated that she has been sober for 2 years as of December 2024.  Client is active in AA program and currently has a sponsor.  Client denied substance misuse in the last 2 years.      Risk of Harm to Self:   Low    Client client denied history of SI and/or self harm.    Does Client currently have or has ever had a HX of HI/Desire to inflict serious injury onto another/Physically Aggressive BX/Verbally aggressive BX?   Denied    Risk of Harm to Others: Low    Client client denied history of HI and/or aggressive behaviors.       Initial Session Narrative     Clinical Formulation  Client reported seeking services today due to previous diagnosis of Bipolar I Disorder with 2 prior manic episodes resulting in hospitalization.  Client reported increased levels of stress as primary trigger for both episodes and has taken steps to maintain healthy sleep patterns, medication management, and overall healthy routines in order to manage diagnosis.  Client is receiving medication " management and is currently prescribed lithium and bupropion, which client reported has been helpful in managing symptom burden.  Client expressed a desire to process trauma associated with previous episodes and rebuild after most recent hospitalization.    Client reported history of substance misuse (alcohol) and 2 psychiatric hospitalizations.  Client reported that she is 2 years sober.  Client denied HX of SI, HI, or aggressive physical behaviors.    Client reported they live with father.    Per Client report, Client meets the criteria for Bipolar I Disorder.    ?Initial intervention/Client Response:   Clinician met with Client in person at [facility]. Clinician explained permission to treat and educated client on the limitations of confidentiality. Clinician utilized MI by asking open ended questions, expressing empathy, and using reflective language in order to build rapport and gather client history and background information.    Clinician completed initial assessment, [place] Suicide Related Assessment, PHQ-9, SHAHID 7, trauma assessment, and explored the Client's protective factors and strengthens.     Clinician explained permission to treat, confidentiality, the limitations of confidentiality, and discussed NO SHOW policy.     Clinician provided the Client with information on DX and possible treatment methods.    Client was receptive throughout the session and agreed to work with this Clinician to obtain their treatment goals.     Follow-up:    Clinician plans to complete any outstanding assessments needed for treatment and complete the Client's Care/Treatment Plan with the client during the next session.          [facility]

## 2025-03-27 ENCOUNTER — TREATMENT (OUTPATIENT)
Dept: PHYSICAL THERAPY | Facility: CLINIC | Age: 35
End: 2025-03-27
Payer: COMMERCIAL

## 2025-03-27 DIAGNOSIS — M54.42 CHRONIC BILATERAL LOW BACK PAIN WITH BILATERAL SCIATICA: Primary | ICD-10-CM

## 2025-03-27 DIAGNOSIS — M54.41 CHRONIC BILATERAL LOW BACK PAIN WITH BILATERAL SCIATICA: Primary | ICD-10-CM

## 2025-03-27 DIAGNOSIS — G89.29 CHRONIC BILATERAL LOW BACK PAIN WITH BILATERAL SCIATICA: Primary | ICD-10-CM

## 2025-03-27 NOTE — PROGRESS NOTES
Physical Therapy Daily Progress Note    Patient: Darrius Katz   : 1990  Diagnosis/ICD-10 Code:  Chronic bilateral low back pain with bilateral sciatica [M54.42, M54.41, G89.29]  Referring practitioner: Taylor Cope DO  Date of Initial Visit: Type: THERAPY  Noted: 3/6/2025  Today's Date: 3/27/2025  Patient seen for 2 sessions         Darrius Katz reports feeling better in terms of pain severity since last visit.  Noticed short term pain relief after her last visit.        Subjective     Objective   See Exercise, Manual, and Modality Logs for complete treatment.       Assessment/Plan  Focused visit on improving proximal hip mm endurance, anterior trunk mm dynamic control and general lower quarter mm endurance.  Will f/u in 2 weeks for re-assessment.            Manual Therapy:         mins  76141;  Therapeutic Exercise:    25     mins  98662;     Neuromuscular Jeni:    13    mins  32435;    Therapeutic Activity:     15     mins  41648;     Gait Training:           mins  43236;     Ultrasound:          mins  62809;    Electrical Stimulation:         mins  93299 ( );  Dry Needling          mins self-pay    Timed Treatment:   53   mins   Total Treatment:     53   mins    Bryon Lundy, PORSHA  Physical Therapist

## 2025-03-29 ENCOUNTER — E-VISIT (OUTPATIENT)
Dept: ADMINISTRATIVE | Facility: OTHER | Age: 35
End: 2025-03-29
Payer: COMMERCIAL

## 2025-03-29 ENCOUNTER — TELEMEDICINE (OUTPATIENT)
Dept: FAMILY MEDICINE CLINIC | Facility: TELEHEALTH | Age: 35
End: 2025-03-29
Payer: COMMERCIAL

## 2025-03-29 ENCOUNTER — E-VISIT (OUTPATIENT)
Dept: FAMILY MEDICINE CLINIC | Facility: TELEHEALTH | Age: 35
End: 2025-03-29
Payer: COMMERCIAL

## 2025-03-29 DIAGNOSIS — M54.42 CHRONIC LEFT-SIDED LOW BACK PAIN WITH BILATERAL SCIATICA: ICD-10-CM

## 2025-03-29 DIAGNOSIS — G89.29 CHRONIC LEFT-SIDED LOW BACK PAIN WITH BILATERAL SCIATICA: ICD-10-CM

## 2025-03-29 DIAGNOSIS — M54.41 CHRONIC LEFT-SIDED LOW BACK PAIN WITH BILATERAL SCIATICA: ICD-10-CM

## 2025-03-29 RX ORDER — PREDNISONE 10 MG/1
TABLET ORAL
Qty: 1 EACH | Refills: 0 | Status: SHIPPED | OUTPATIENT
Start: 2025-03-29

## 2025-03-29 RX ORDER — ZOLPIDEM TARTRATE 6.25 MG/1
TABLET, FILM COATED, EXTENDED RELEASE ORAL
COMMUNITY
Start: 2025-03-07

## 2025-03-29 RX ORDER — MELOXICAM 15 MG/1
15 TABLET ORAL DAILY PRN
Qty: 30 TABLET | Refills: 0 | Status: SHIPPED | OUTPATIENT
Start: 2025-03-29

## 2025-03-29 RX ORDER — CLONIDINE HYDROCHLORIDE 0.2 MG/1
TABLET ORAL
COMMUNITY
Start: 2025-03-06

## 2025-03-29 RX ORDER — CYCLOBENZAPRINE HCL 10 MG
10 TABLET ORAL 3 TIMES DAILY PRN
Qty: 30 TABLET | Refills: 0 | Status: SHIPPED | OUTPATIENT
Start: 2025-03-29 | End: 2025-04-08

## 2025-03-29 NOTE — E-VISIT ESCALATED
Status: Referred Out  Date: 2025 10:31:17  Acuity Level: Within 1-2 weeks  Referral message:  We're sorry you are not feeling well. Your safety is important to us. Low back pain caused by muscle strains or overuse generally resolves without treatment in a few weeks. Back pain over 12 weeks ago is considered chronic pain which   will require multiple follow-ups. We would like for you to be seen in person to determine the cause of your symptoms and care that would be most effective for you.  For the most appropriate care, please be seen:   At a clinic  Within 1-2 weeks   You   won't be charged for this visit.&nbsp;We hope you feel better soon!   Patient: Darrius Katz  Patient : 1990  Patient Address: [address] [place] [address]  Patient Phone: [phone]  Clinician Response: Unavailable  Diagnosis: Unavailable  Diagnosis ICD: Unavailable     Patient Interview Questions and Responses:  Clinical Protocol: Low back pain  Please select the reason for your visit today.: Low back pain  When did your back pain begin?: More than 12 weeks ago

## 2025-03-29 NOTE — E-VISIT ESCALATED
Date: 2025 12:57:35  Clinician: Ema Lamb  Clinician NPI: [id number]  Patient: Darrius Katz  Patient : 1990  [place] [id number]  Patient Phone: [phone]  Visit Protocol: Low back pain  Patient Summary:  Darrius is a 34 year old ( : 1990 ) female who initiated a visit for evaluation of low back pain.     The back pain began gradually 3-4 weeks ago. The pain is present on both sides and is located in the mid back and low back   area.   The pain varies depending on the activity.   Darrius is able to walk on toes and is able to walk on heels with toes lifted off the ground.   Darrius is experiencing back muscle spasms.   Symptom Details   Pain: The pain is moderate (4-6 on a 10   point pain scale).    Darrius denies loss of bladder control, leg weakness, feeling feverish, abdominal pain, vomiting, loss of bowel control, a rash in the same area as the back pain, shooting pain, numbness or tingling in the legs, urinary retention,   saddle anesthesia, urinary frequency, dysuria, and hematuria. The pain does not decrease when bending forward.   Precipitating events  The back pain did not begin in response to an injury that happened at work. Darrius doesn't know what caused the back   pain.   Pertinent medical history  Darrius has had similar episodes of back pain in the past.   Darrius has not had cancer, unintended weight loss in the last three months, back surgery, and kidney stones.   Treatments  Darrius has not seen a   provider to treat this episode of low back pain.   Darrius has not been told by provider to avoid NSAIDs.   Darrius tried using over-the-counter medications (such as ibuprofen, aspirin, Tylenol), prescription medications, and other approaches (such as   cold/heat pack, physical therapy, chiropractor) to manage this episode of low back pain.      Medication used to manage this episode of low back pain as reported by  the patient (free text): Meloxicam 15 mg- 1PRN  Naproxen 400mg- once a day       Other approaches used to manage this episode of low back pain: physical therapy      Darrius denies pregnancy and denies breastfeeding.   Darrius does not smoke or use smokeless tobacco.   Darrius does not vape or use other e-cigarette products.   Reason for repeat visit for the same protocol within 24 hours:  Lower back pain/spasms   interfering with work and meloxicam is not working.  See the History of referred by protocol and completed visits section for details on previous visits (visits currently in queue to be diagnosed will not appear in this section).    MEDICATIONS: hydroxyzine HCl oral, clonidine HCl oral, meloxicam oral, bupropion HCl oral, lithium carbonate oral, Angy oral, lithium carbonate oral, ALLERGIES: sulfasalazine  Clinician Response:  Dear Darrius,   I am sorry you are not feeling well. To determine the most appropriate care for you, I would like you to be seen in person to further discuss your health history and symptoms.  You will not be charged for this visit.   Thank you for trusting us with your care.   Diagnosis: Refer for additional evaluation  Diagnosis ICD: R69  Additional Clinician Notes:  You have checked in for an evisit 3 times today and with different complaints. I feel you need a physical exam to diagnose the nature of your back pain.

## 2025-03-29 NOTE — PATIENT INSTRUCTIONS
Managing Chronic Back Pain  Chronic back pain is pain that lasts longer than 3 months. It often affects the lower back. It may feel like a muscle ache or a sharp, stabbing pain. It can be mild, moderate, or severe.  There are things you can do to help manage your pain. See what works best for you. Your health care provider may also give you other instructions.  What actions can I take to manage my chronic back pain?  You may be given a treatment plan by your provider. Treatment often starts with rest and pain relief. It may also include:  Physical therapy. These are exercises to help restore movement and strength to your back.  Techniques to help you relax.  Counseling or therapy. Cognitive behavioral therapy (CBT) is a form of therapy that helps you set goals and make changes.  Acupuncture or massage therapy.  Local electrical stimulation.  Injections. You may be given medicines to numb an area or relieve pain.  If other treatments do not help, you may need surgery.  How to use body mechanics and posture to help with pain  You can help relieve stress on your back with good posture and healthy body mechanics. Body mechanics are all the ways your body moves during the day. Posture is part of body mechanics. Good posture means:  Your spine is in its correct S-curve, or neutral, position.  Your shoulders are pulled back a bit.  Your head is not tipped forward.  To improve your posture and body mechanics, follow these guidelines.  Standing    When standing, keep your feet about hip-width apart. Keep your knees slightly bent. Your ears, shoulders, and hips should line up. Your spine should be neutral.  When you  one place for a long time, place one foot on a stable object that is 2-4 inches (5-10 cm) high, such as a footstool.  Sitting    When sitting, keep your feet flat on the floor. Use a footrest, if needed. Keep your thighs parallel to the floor. Try not to round your shoulders or tilt your head  forward.  When working at a desk or a computer:  Position your desk so your hands are a little lower than your elbows.  Slide your chair under your desk so you are close enough to have good posture.  Position your monitor so you are looking straight ahead and do not have to tilt your head to view the screen.  Lifting    Keep your feet shoulder-width apart. Tighten the muscles of your abdomen.  Bend your knees and hips. Keep your spine neutral. Lift using the strength of your legs, not your back. Do not lock your knees straight out.  Ask for help to lift heavy or awkward objects.  Resting    Do not lie down in a way that causes pain.  If you have pain when you sit, bend, stoop, or squat, lie in a way that your body does not bend much. Try not to curl up on your side with your arms and knees near your chest (fetal position).  If it hurts to stand for a long time or reach with your arms, lie with your spine neutral and knees bent slightly. Try lying:  On your side with a pillow between your knees.  On your back with a pillow under your knees.  How to recognize changes in your chronic back pain  Let your provider know if your pain gets worse or does not get better with treatment. Your back pain may be getting worse if you have pain that:  Starts to cause problems with your posture.  Gets worse when you sit, stand, walk, bend, or lift things.  Happens when you are active, at rest, or both.  Makes it hard for you to move around (limits mobility).  Occurs with fever, weight loss, or trouble peeing (urinating).  Causes numbness and tingling.  Follow these instructions at home:  Medicines  You may need to take medicines for pain and inflammation. These may be taken by mouth or put on the skin. You may also be given muscle relaxants.  Take over-the-counter and prescription medicines only as told by your provider.  Ask your provider if the medicine prescribed to you:  Requires you to avoid driving or using machinery.  Can  cause constipation. You may need to take these actions to prevent or treat constipation:  Drink enough fluid to keep your pee (urine) pale yellow.  Take over-the-counter or prescription medicines.  Eat foods that are high in fiber, such as beans, whole grains, and fresh fruits and vegetables.  Limit foods that are high in fat and processed sugars, such as fried or sweet foods.  Lifestyle  Do not use any products that contain nicotine or tobacco. These products include cigarettes, chewing tobacco, and vaping devices, such as e-cigarettes. If you need help quitting, ask your provider.  Eat a healthy diet. Eat lots of vegetables, fruits, fish, and lean meats.  Work with your provider to stay at a healthy weight.  General instructions  Get regular exercise as told. Exercise can help with flexibility and strength.  If physical therapy was prescribed, do exercises as told by your provider.  Use ice or heat therapy as told by your provider.  Where can I get support?  Think about joining a support group for people with chronic back pain. You can find some groups at:  Pain Connection Program: painconnection.org  The American Chronic Pain Association: acpanow.com  Contact a health care provider if:  Your pain does not get better with rest or medicine.  You have new pain.  You have a fever.  You lose weight quickly.  You have trouble doing your normal activities.  You feel weak or numb in one or both of your legs or feet.  Get help right away if:  You are not able to control when you pee or poop.  You have severe back pain and:  Nausea or vomiting.  Pain in your chest or abdomen.  Shortness of breath.  You faint.  These symptoms may be an emergency. Get help right away. Call 911.  Do not wait to see if the symptoms will go away.  Do not drive yourself to the hospital.  This information is not intended to replace advice given to you by your health care provider. Make sure you discuss any questions you have with your health care  provider.  Document Revised: 08/07/2023 Document Reviewed: 08/07/2023  Elsevier Patient Education © 2024 [employer].

## 2025-03-29 NOTE — PROGRESS NOTES
No chief complaint on file.      Video Visit Reason:   Free Text Description: Back spasms/pain interfering with work and daily activities  Carmen Katz is a 34 y.o. female.     History of Present Illness  The patient presents via virtual visit for evaluation of back spasms and pain.    She reports experiencing back spasms, which she describes as more severe than any previous episodes. The onset of these spasms was noted yesterday. She is currently engaged in physical therapy for her back pain, with her most recent session being on Thursday. She speculates that this session may have contributed to her current discomfort. Her primary care physician had initially suspected sciatica based on her symptoms and prescribed meloxicam 15 mg. However, the physical therapist's assessment did not support this diagnosis, nor did it suggest a nerve-related issue. She has not undergone any imaging studies. She is not currently taking any muscle relaxants and has not been prescribed steroids recently. She is scheduled to see her primary care physician in June 2025 but is attempting to secure an earlier appointment. She has previously been prescribed meloxicam for this condition.          The following portions of the patient's history were reviewed and updated as appropriate: allergies, current medications, past medical history, and problem list.      Past Medical History:   Diagnosis Date    ADHD (attention deficit hyperactivity disorder) 2006    Anxiety 2021    First psychotic episode    Depression 2024     Social History     Socioeconomic History    Marital status: Single   Tobacco Use    Smoking status: Never     Passive exposure: Never    Smokeless tobacco: Never   Vaping Use    Vaping status: Never Used   Substance and Sexual Activity    Alcohol use: Yes    Drug use: Never    Sexual activity: Not Currently     Partners: Male     Birth control/protection: Birth control pill     medication documentation:  reviewed and updated with patient and   Current Outpatient Medications:     cloNIDine (CATAPRES) 0.2 MG tablet, , Disp: , Rfl:     meloxicam (MOBIC) 15 MG tablet, Take 1 tablet by mouth Daily As Needed for Mild Pain (low back pain with left sciatica)., Disp: 30 tablet, Rfl: 0    zolpidem CR (AMBIEN CR) 6.25 MG CR tablet, , Disp: , Rfl:     buPROPion XL (WELLBUTRIN XL) 300 MG 24 hr tablet, , Disp: , Rfl:     cloNIDine (CATAPRES) 0.1 MG tablet, , Disp: , Rfl:     cyclobenzaprine (FLEXERIL) 10 MG tablet, Take 1 tablet by mouth 3 (Three) Times a Day As Needed for Muscle Spasms for up to 10 days., Disp: 30 tablet, Rfl: 0    Angy 0.35 MG tablet, Take 1 tablet by mouth Daily., Disp: , Rfl:     hydrOXYzine (ATARAX) 50 MG tablet, , Disp: , Rfl:     lithium 300 MG tablet, Take 1 tablet by mouth., Disp: , Rfl:     lithium carbonate 150 MG capsule, 1 capsule., Disp: , Rfl:     predniSONE (DELTASONE) 10 MG (48) dose pack, Take as directed., Disp: 1 each, Rfl: 0    valACYclovir (VALTREX) 500 MG tablet, 1 tablet., Disp: , Rfl:   Review of Systems  See HPI  Objective   Physical Exam  Constitutional:       General: She is not in acute distress.     Appearance: She is not ill-appearing.   Pulmonary:      Effort: Pulmonary effort is normal.   Musculoskeletal:      Comments: Gait is normal, pain is worse in the left hip and lower back   Neurological:      Mental Status: She is alert.         Assessment & Plan   Diagnoses and all orders for this visit:    1. Chronic left-sided low back pain with bilateral sciatica  -     predniSONE (DELTASONE) 10 MG (48) dose pack; Take as directed.  Dispense: 1 each; Refill: 0  -     cyclobenzaprine (FLEXERIL) 10 MG tablet; Take 1 tablet by mouth 3 (Three) Times a Day As Needed for Muscle Spasms for up to 10 days.  Dispense: 30 tablet; Refill: 0  -     meloxicam (MOBIC) 15 MG tablet; Take 1 tablet by mouth Daily As Needed for Mild Pain (low back pain with left sciatica).  Dispense: 30 tablet;  Refill: 0      Acute on Chronic back pain with spasms   The patient reports experiencing back spasms that started yesterday, primarily affecting the left hip and lower back area. She has been undergoing physical therapy, which may have exacerbated the condition. The physical therapist did not identify it as a nerve issue. A prescription for meloxicam 15 mg has been refilled. Additionally, a steroid pack (prednisone) and Flexeril have been prescribed to manage the spasms and inflammation.             Follow Up:  If your symptoms are not resolving by the completion of your treatment or are worsening, see your primary care provider for follow up. If you don't have a primary care provider, you may go to any Urgent Care for re-evaluation. If you develop any life threatening symptoms, go to the nearest Emergency Department immediately or call EMS.       Patient or patient representative verbalized consent for the use of Ambient Listening during the visit with  JONATHAN Chong for chart documentation. 3/29/2025  16:36 EDT        The use of  Video Visit was utilized during this visit, using both [employer] and [employer]. The use of a video visit has been reviewed with the patient and verbal informed consent has been obtained. No technical difficulties occurred during the visit.    is located at [address] [place] [id number]  Provider is located at [place], [place]

## 2025-03-29 NOTE — E-VISIT ESCALATED
Status: Referred Out  Date: 2025 10:38:21  Acuity Level: Within 8 hours  Referral message:   We're sorry you are not feeling well. Your safety is important to us. Back pain with weakness in legs is a symptom of increased pressure on the nerves that lead to the legs. There are a number of different causes for the weakness. For   this reason, we would like for you to be seen in person to rule out a serious condition and determine the most effective treatment for you.  For the most appropriate care, please be seen:   At a clinic or an urgent care  Within 8 hours   If the weakness   is getting worse very quickly,   Call 911 or go to an emergency room   You won't be charged for this visit. We hope you feel better soon!    Patient: Darrius Katz  Patient : 1990  Patient Address: [address] [place] [address]  Patient Phone: [phone]  Clinician Response: Unavailable  Diagnosis: Unavailable  Diagnosis ICD: Unavailable     Patient Interview Questions and Responses:  Clinical Protocol: Low back pain  Please select the reason for your visit today.: Low back pain  When did your back pain begin?: 3-4 weeks ago  Did your back pain begin suddenly?: No  Where is your back pain located? Select all that apply. Buttocks: No  Where is your back pain located? Select all that apply. Low back area: Yes  Where is your back pain located? Select all that apply. Mid back area: No  Where is your back pain located? Select all that apply. Upper back area: No  Where is your back pain located? Select all that apply. Neck: No  Some conditions are limited to one side of the body. This information will help identify a possible cause of your pain.Which side is the pain on?: Both sides  Please describe your back pain. Constant - it is the same all the time: No  Please describe your back pain. It varies depending on activity: Yes  Please describe your back pain. It goes away when resting: No  Please describe your back  pain. Can feel back muscles spasm at times: Yes  Does pain decrease when bending forward?: No  Please rate the severity of your back pain on a pain scale, with 0 being no pain and 10 being the worst pain imaginable.: 4-6 = Moderate Pain (interferes with normal daily activities)  Does the back pain shoot to other areas?: No  Low back pain can be a symptom of other conditions. For this reason, we would like to know if there are other symptoms in addition to the back pain.Since the back pain began, have you noticed any of the following? Numbness or tingling in legs: No  Low back pain can be a symptom of other conditions. For this reason, we would like to know if there are other symptoms in addition to the back pain.Since the back pain began, have you noticed any of the following? Weakness in legs: Yes

## 2025-04-03 ENCOUNTER — OFFICE VISIT (OUTPATIENT)
Age: 35
End: 2025-04-03
Payer: COMMERCIAL

## 2025-04-03 DIAGNOSIS — F31.30 BIPOLAR I DISORDER, MOST RECENT EPISODE DEPRESSED: Primary | ICD-10-CM

## 2025-04-03 NOTE — PROGRESS NOTES
Progress Note     Date: 04/03/2025  Client Name: Darrius Katz  MRN: [id number]  Start Time:    11:56 AM  End Time:    12:52 PM     Diagnoses and all orders for this visit:    1. Bipolar I disorder, most recent episode depressed (Primary)         Active Symptoms/Chief Complainant:   Negative self-talk, anxious thought patterns, anhedonia, excessive worry         MENTAL STATUS EXAM   General Appearance:  Cleanly groomed and dressed  Eye Contact:  Good eye contact  Attitude:  Cooperative  Motor Activity:  Normal gait, posture  Speech:  Normal rate, tone, volume  Mood and affect:  Appropriate and mood congruent  Thought Process:  Logical and linear  Associations/ Thought Content:  No delusions  Hallucinations:  None  Suicidal Ideations:  Not present  Homicidal Ideation:  Not present  Sensorium:  Alert and clear  Orientation:  Person, place and time  Fund of Knowledge:  Appropriate for age and educational level  Insight:  Good  Judgement:  Good  Reliability:  Good           Risk to self:  Low  No new reported incidents of SI and/or self harm    Risk others:  Low  No new reported incidents of HI and/or aggressive behavior    Progress Note Intervention/Client Response     Progress Note Intervention:     Met with client at [facility] for individual session.     Utilized MI techniques of OARS and providing empathy to explore current stressors and assess sx burden. Clinician continued to build rapport with client during session utilizing motivational interviewing and empathic listening. Utilized MI goal setting techniques to establish treatment goals and complete care plan in collaboration with client.     Utilized CBT reflective listening techniques in order to process feelings of increased anxiety and anhedonia experienced before bed.  Prompted client to utilize cognitive triangle principle to identify related thoughts, feelings, and behaviors and to identify physical sensations associated with  emotions experienced.  Provided psychoeducation on CBT cognitive distortions and processed examples of cognitive distortions and client's self-talk.  Encouraged client to notice and acknowledge when using cognitive distortions between sessions.      Client response:     Client was receptive to MI and CBT intervention. Client was able to identify goals for therapy and was actively engaged in treatment planning process.  Client reported since last session with clinician increased feelings of anxiety and restlessness at night.  Client was able to use cognitive triangle to identify related thoughts, feelings, and behaviors and was able to identify physical sensations associated with emotions.  Client expressed understanding of psychoeducation provided about cognitive distortions and was able to identify examples of various cognitive distortions in her self-talk.  Client agreed to practice self-awareness by recognizing use of cognitive distortions between sessions.    Client engaged in active discussion with therapist and appeared receptive to therapeutic intervention/clinician feedback.       Follow-up:    At next session, clinician will continue CBT intervention.    New plan of care was created and entered today with the client.  Too soon to assess any type of progress due to new plan being entered this date.        [facility]

## 2025-04-03 NOTE — PLAN OF CARE
"Goal:     \" Being more present.\"     Objective:    Over the next 90 days, I will learn and utilize at least 3 coping skills to improve overall mood and decrease disassociative symptoms, as measured by Client reports and reductions in the PHQ-9 and SHAHID 7.       Progress toward goal:  Not appropriate to rate progress toward goal since this is the initial treatment plan.  "

## 2025-04-10 ENCOUNTER — TREATMENT (OUTPATIENT)
Dept: PHYSICAL THERAPY | Facility: CLINIC | Age: 35
End: 2025-04-10
Payer: COMMERCIAL

## 2025-04-10 DIAGNOSIS — M54.41 CHRONIC BILATERAL LOW BACK PAIN WITH BILATERAL SCIATICA: Primary | ICD-10-CM

## 2025-04-10 DIAGNOSIS — M54.42 CHRONIC BILATERAL LOW BACK PAIN WITH BILATERAL SCIATICA: Primary | ICD-10-CM

## 2025-04-10 DIAGNOSIS — G89.29 CHRONIC BILATERAL LOW BACK PAIN WITH BILATERAL SCIATICA: Primary | ICD-10-CM

## 2025-04-10 NOTE — PROGRESS NOTES
Physical Therapy Daily Progress Note    Patient: Darrius Katz   : 1990  Diagnosis/ICD-10 Code:  No primary diagnosis found.  Referring practitioner: Taylor Cope DO  Date of Initial Visit: Type: THERAPY  Noted: 3/6/2025  Today's Date: 4/10/2025  Patient seen for 3 sessions         Darrius Katz reports feeling better overall.  Was having muscle spasms in the low back for 2 days after her last visit.  Feels good today.  No right hip pain.  Still having some infrequent left hip pain.  Low back pain is improving.  No longer having pain with bending down at work.  No sleep disturbance from low back or hip pain any longer.      Subjective     Objective   See Exercise, Manual, and Modality Logs for complete treatment.     *Hip aBd/ext MMT:   bailee;  bailee  *Upper abdominal MMT:      Assessment/Plan  Ms. Katz has attended physical therapy for a total of 3 visits for chronic low back and bilateral hip pain.  Right hip pain has resolved.  Hip and trunk strength have improved.  Focused visit today on improving proximal hip and trunk strength mainly via isometrics.  Will f/u in 2 weeks to initiate weight-bearing exercises.            Manual Therapy:    8     mins  51970;  Therapeutic Exercise:    10     mins  88169;     Neuromuscular Jeni:    9    mins  16541;    Therapeutic Activity:     9     mins  55504;     Gait Training:           mins  31264;     Ultrasound:         mins  30069;    Electrical Stimulation:         mins  07579 ( );  Dry Needling          mins self-pay    Timed Treatment:   36   mins   Total Treatment:     36   mins    Bryon Lundy PT  Physical Therapist

## 2025-04-24 ENCOUNTER — OFFICE VISIT (OUTPATIENT)
Dept: FAMILY MEDICINE CLINIC | Facility: CLINIC | Age: 35
End: 2025-04-24
Payer: COMMERCIAL

## 2025-04-24 ENCOUNTER — TREATMENT (OUTPATIENT)
Dept: PHYSICAL THERAPY | Facility: CLINIC | Age: 35
End: 2025-04-24
Payer: COMMERCIAL

## 2025-04-24 ENCOUNTER — LAB (OUTPATIENT)
Dept: LAB | Facility: HOSPITAL | Age: 35
End: 2025-04-24
Payer: COMMERCIAL

## 2025-04-24 VITALS
OXYGEN SATURATION: 98 % | BODY MASS INDEX: 36.57 KG/M2 | WEIGHT: 233 LBS | DIASTOLIC BLOOD PRESSURE: 80 MMHG | SYSTOLIC BLOOD PRESSURE: 122 MMHG | TEMPERATURE: 96.7 F | HEART RATE: 98 BPM | HEIGHT: 67 IN

## 2025-04-24 DIAGNOSIS — G89.29 CHRONIC BILATERAL LOW BACK PAIN WITH BILATERAL SCIATICA: Primary | ICD-10-CM

## 2025-04-24 DIAGNOSIS — M54.42 CHRONIC BILATERAL LOW BACK PAIN WITH BILATERAL SCIATICA: Primary | ICD-10-CM

## 2025-04-24 DIAGNOSIS — M54.42 CHRONIC LEFT-SIDED LOW BACK PAIN WITH BILATERAL SCIATICA: Primary | ICD-10-CM

## 2025-04-24 DIAGNOSIS — R25.1 TREMOR, UNSPECIFIED: ICD-10-CM

## 2025-04-24 DIAGNOSIS — Z51.81 MEDICATION MONITORING ENCOUNTER: ICD-10-CM

## 2025-04-24 DIAGNOSIS — M54.41 CHRONIC BILATERAL LOW BACK PAIN WITH BILATERAL SCIATICA: Primary | ICD-10-CM

## 2025-04-24 DIAGNOSIS — M54.41 CHRONIC LEFT-SIDED LOW BACK PAIN WITH BILATERAL SCIATICA: Primary | ICD-10-CM

## 2025-04-24 DIAGNOSIS — M62.830 SPASM OF MUSCLE OF LOWER BACK: ICD-10-CM

## 2025-04-24 DIAGNOSIS — G89.29 CHRONIC LEFT-SIDED LOW BACK PAIN WITH BILATERAL SCIATICA: Primary | ICD-10-CM

## 2025-04-24 PROCEDURE — 80178 ASSAY OF LITHIUM: CPT

## 2025-04-24 PROCEDURE — 80048 BASIC METABOLIC PNL TOTAL CA: CPT

## 2025-04-24 RX ORDER — BUSPIRONE HYDROCHLORIDE 7.5 MG/1
TABLET ORAL
COMMUNITY
Start: 2025-04-04

## 2025-04-24 RX ORDER — METHOCARBAMOL 500 MG/1
500 TABLET, FILM COATED ORAL NIGHTLY PRN
Qty: 30 TABLET | Refills: 0 | Status: SHIPPED | OUTPATIENT
Start: 2025-04-24

## 2025-04-24 NOTE — PROGRESS NOTES
Office Note     Name: Darrius Katz    : 1990     MRN: [id number]     Chief Complaint  back spasms (Spasms started after PT)    Subjective     History of Present Illness:  Darrius Katz is a 34 y.o. female who presents today for follow up on low back pain. Patient follows with Dr. Cope.     Has had chronic low back pain for about 8 years.  She saw a chiropractor for many years which helped until recently, so she saw her PCP who recommended PT. She has done 4 sessions of PT.  Has noticed an increased in lower back spasms left side with starting PT.  Not having pain or spasms during PT sessions. She she did recently see a telehealth provider who provided her with a refill of meloxicam and provided Flexeril.  Patient states she notices the back spasms more with prolonged standing and sitting for extended periods of time.  Notices them mostly at nighttime. She states the Flexeril did not help much. She has been using some heat, too. She states it is the worst after she works at her job at the end of the day. She works at the [employer] and stands most of the day. She reports that her physical therapist does not think her pain is nerve related. She states that the back pain has improved, but now experiencing more tension on muscle on the left. Denies saddle anesthesia, inability to move legs, gait changes, fevers, chills or loss of bowel or bladder control. Requesting a refill on the Meloxicam.     Has been experiencing tremors for a while that she believes are likely secondary to her anxiety for quite some time.  Notices in hands and legs. Patient follows with psychiatrist who prescribes her Lithium.  Patient recently started on BuSpar.  States her lithium levels were checked on  and were within normal limits.  Patient states she sees her psychiatrist next week.  Notices more when she is holding her nephew  and worried she may drop him or other times when she is anxious. She states  child health examination without abnormal findings    Need for vaccination  - DTAP IPV VACC 4 THRU 6 YRS  - MMRV, MEASLES MUMPS RUBELLA VARICELLA VACC (PROQUAD)      PLAN:  Resolved viral uri  All parental concerns and questions discussed.  Anticipatory guidance provided, handout given.  · Safety/car/bicycle/fire/sharp objects/falls/water  · Development  · Discipline  · Diet  · Television  · Analgesics/Antipyretics  · Sun exposure  · Tobacco-free home  · Dental Care  · Lead exposure risk: None                Immunizations per orders.  Risks, benefits, and side effects discussed.  Return to clinic in 1 year for well child exam or sooner as needed for illness/concerns.      Gloria Blue MD   she gets nervous at the doctor some too.   States sometimes she will even feel them in her legs.  She states it is not all the time though. Denies inability to control them. Does not feel it is impacting her ability to do her normal tasks.  Denies chest pain or shortness of breath.  She states that these started several months ago prior to her starting the meloxicam or the BuSpar. Her psychiatrist asked her to keep journal/log of when they are happening. Denies SI or HI.        Review of Systems:   Review of Systems   Constitutional:  Negative for chills and fever.   Respiratory:  Negative for chest tightness and shortness of breath.    Cardiovascular:  Negative for chest pain and palpitations.   Gastrointestinal:  Negative for abdominal pain.   Musculoskeletal:  Positive for back pain and myalgias.   Neurological:  Negative for dizziness, light-headedness and numbness.   Psychiatric/Behavioral:  Negative for self-injury and suicidal ideas. The patient is nervous/anxious.        Past Medical History:   Past Medical History:   Diagnosis Date    ADHD (attention deficit hyperactivity disorder) 2006    Anxiety 2021    First psychotic episode    Depression 2024       Past Surgical History: History reviewed. No pertinent surgical history.    Family History:   Family History   Problem Relation Age of Onset    Cancer Maternal Grandfather     Alcohol abuse Paternal Grandfather     Diabetes Paternal Grandfather     Cancer Maternal Aunt        Social History:   Social History     Socioeconomic History    Marital status: Single   Tobacco Use    Smoking status: Never     Passive exposure: Never    Smokeless tobacco: Never   Vaping Use    Vaping status: Never Used   Substance and Sexual Activity    Alcohol use: Yes    Drug use: Never    Sexual activity: Not Currently     Partners: Male     Birth control/protection: Birth control pill       Immunizations:   Immunization History   Administered Date(s) Administered    DTaP,  "Unspecified 1990, 1990, 1990, 01/23/1992, 06/22/1995    Hep A, Unspecified 07/27/2000, 07/16/2001    Hep B, Adolescent or Pediatric 07/27/1998, 03/15/1999, 09/24/1999    HiB 1990, 03/07/1991, 07/02/1991, 10/10/1991    INFLUENZA NASAL UF 11/17/2007    MMR 10/31/1991, 07/27/1998    Meningococcal, Unspecified 07/02/2007    Polio, Unspecified 1990, 1990, 01/23/1992, 06/22/1995    Tdap 06/20/2005, 05/18/2021    Varicella 06/11/2003        Medications:     Current Outpatient Medications:     buPROPion XL (WELLBUTRIN XL) 300 MG 24 hr tablet, , Disp: , Rfl:     busPIRone (BUSPAR) 7.5 MG tablet, , Disp: , Rfl:     Angy 0.35 MG tablet, Take 1 tablet by mouth Daily., Disp: , Rfl:     hydrOXYzine (ATARAX) 50 MG tablet, , Disp: , Rfl:     lithium 300 MG tablet, Take 1 tablet by mouth., Disp: , Rfl:     lithium carbonate 150 MG capsule, 1 capsule., Disp: , Rfl:     valACYclovir (VALTREX) 500 MG tablet, 1 tablet., Disp: , Rfl:     meloxicam (MOBIC) 15 MG tablet, Take 1 tablet by mouth Daily As Needed for Mild Pain (low back pain with left sciatica)., Disp: 30 tablet, Rfl: 0    methocarbamol (ROBAXIN) 500 MG tablet, Take 1 tablet by mouth At Night As Needed for Muscle Spasms., Disp: 30 tablet, Rfl: 0    Allergies:   Allergies   Allergen Reactions    Sulfa Antibiotics Rash       Objective     Vital Signs  /80 (BP Location: Right arm, Patient Position: Sitting, Cuff Size: Adult)   Pulse 98   Temp 96.7 °F (35.9 °C) (Temporal)   Ht 170.2 cm (67\")   Wt 106 kg (233 lb)   SpO2 98%   BMI 36.49 kg/m²   Estimated body mass index is 36.49 kg/m² as calculated from the following:    Height as of this encounter: 170.2 cm (67\").    Weight as of this encounter: 106 kg (233 lb).           Physical Exam  Vitals reviewed.   Constitutional:       General: She is not in acute distress.     Appearance: Normal appearance. She is not ill-appearing or toxic-appearing.   HENT:      Head: Normocephalic and " atraumatic.   Eyes:      Pupils: Pupils are equal, round, and reactive to light.   Cardiovascular:      Rate and Rhythm: Normal rate and regular rhythm.      Pulses: Normal pulses.      Heart sounds: Normal heart sounds.   Pulmonary:      Effort: Pulmonary effort is normal. No respiratory distress.      Breath sounds: Normal breath sounds. No wheezing, rhonchi or rales.   Musculoskeletal:      Cervical back: Normal range of motion.      Lumbar back: Spasms present. No swelling, deformity, signs of trauma, tenderness or bony tenderness.        Back:    Lymphadenopathy:      Cervical: No cervical adenopathy.   Skin:     General: Skin is warm.   Neurological:      General: No focal deficit present.      Mental Status: She is alert and oriented to person, place, and time.   Psychiatric:         Mood and Affect: Mood normal.            Results:  No results found for this or any previous visit (from the past 24 hours).       Assessment and Plan     Assessment/Plan:  Diagnoses and all orders for this visit:    1. Chronic left-sided low back pain with bilateral sciatica (Primary)  -     methocarbamol (ROBAXIN) 500 MG tablet; Take 1 tablet by mouth At Night As Needed for Muscle Spasms.  Dispense: 30 tablet; Refill: 0    2. Spasm of muscle of lower back  Assessment & Plan:  Continue Meloxicam for now.  Continue PT.   Will trial Robaxin as needed for muscle spasms.   Counseled on potential side effect of somnolence with Robaxin. Do not operate heavy machinery or cars while taking.   Encourage use of heat on low back.  Patient to notify myself or PCP if symptoms worsening or not improving.      Orders:  -     methocarbamol (ROBAXIN) 500 MG tablet; Take 1 tablet by mouth At Night As Needed for Muscle Spasms.  Dispense: 30 tablet; Refill: 0    3. Medication monitoring encounter  -     Basic Metabolic Panel; Future  -     Lithium Level; Future    4. Tremor, unspecified  Assessment & Plan:  Counseled on risk that Meloxicam can  increase Lithium levels, so would like to check her levels to make sure this is not the cause of her tremors.     Tremors seem to be more related to when she is anxious and have not changed since starting Meloxicam. Would expect them to be all of the time if related to medication interactions.     Recommend to discuss with psychiatrist.         Counseled on risk that Meloxicam can increase Lithium levels, so would like to check her levels to make sure this is not the cause of her tremors.     Tremors seem to be more related to when she is anxious and have not changed since starting Meloxicam. Would expect them to be all of the time if related to medication interactions.       Follow Up  Return if symptoms worsen or fail to improve, for Next scheduled follow up.    Alice Gifford PA-C   [facility]

## 2025-04-25 LAB
ANION GAP SERPL CALCULATED.3IONS-SCNC: 10 MMOL/L (ref 5–15)
BUN SERPL-MCNC: 6 MG/DL (ref 6–20)
BUN/CREAT SERPL: 6.3 (ref 7–25)
CALCIUM SPEC-SCNC: 9.4 MG/DL (ref 8.6–10.5)
CHLORIDE SERPL-SCNC: 106 MMOL/L (ref 98–107)
CO2 SERPL-SCNC: 22 MMOL/L (ref 22–29)
CREAT SERPL-MCNC: 0.96 MG/DL (ref 0.57–1)
EGFRCR SERPLBLD CKD-EPI 2021: 79.8 ML/MIN/1.73
GLUCOSE SERPL-MCNC: 95 MG/DL (ref 65–99)
LITHIUM SERPL-SCNC: 0.9 MMOL/L (ref 0.6–1.2)
POTASSIUM SERPL-SCNC: 4.1 MMOL/L (ref 3.5–5.2)
SODIUM SERPL-SCNC: 138 MMOL/L (ref 136–145)

## 2025-04-25 NOTE — PROGRESS NOTES
Physical Therapy Daily Progress Note    Patient: Darrius Katz   : 1990  Diagnosis/ICD-10 Code:  Chronic bilateral low back pain with bilateral sciatica [M54.42, M54.41, G89.29]  Referring practitioner: Taylor Cope DO  Date of Initial Visit: Type: THERAPY  Noted: 3/6/2025  Today's Date: 2025  Patient seen for 4 sessions         Darrius Katz reports feeling worse since last visit.  Notes more pain and stiffness with trunk rotation (right mainly).  Has not been as consistent with home exercises this past week.      Subjective     Objective   See Exercise, Manual, and Modality Logs for complete treatment.       Assessment/Plan  Focused visit on improving lumbar spine region pain via manual therapy.  Combined with improving force output of trunk mm.  Followed by improving proximal thigh and hip strength.  Will f/u in 2 weeks for re-assessment.            Manual Therapy:    8     mins  41334;  Therapeutic Exercise:    23     mins  81866;     Neuromuscular Jeni:    8    mins  41478;    Therapeutic Activity:     8     mins  88889;     Gait Training:           mins  18132;     Ultrasound:          mins  26180;    Electrical Stimulation:         mins  40908 ( );  Dry Needling          mins self-pay    Timed Treatment:   47   mins   Total Treatment:     47   mins    Bryon Lundy, PT  Physical Therapist

## 2025-04-28 PROBLEM — M54.42 CHRONIC LEFT-SIDED LOW BACK PAIN WITH BILATERAL SCIATICA: Status: ACTIVE | Noted: 2025-04-28

## 2025-04-28 PROBLEM — M54.41 CHRONIC LEFT-SIDED LOW BACK PAIN WITH BILATERAL SCIATICA: Status: ACTIVE | Noted: 2025-04-28

## 2025-04-28 PROBLEM — R25.1 TREMOR, UNSPECIFIED: Status: ACTIVE | Noted: 2025-04-28

## 2025-04-28 PROBLEM — G89.29 CHRONIC LEFT-SIDED LOW BACK PAIN WITH BILATERAL SCIATICA: Status: ACTIVE | Noted: 2025-04-28

## 2025-04-28 PROBLEM — M62.830 SPASM OF MUSCLE OF LOWER BACK: Status: ACTIVE | Noted: 2025-04-28

## 2025-04-28 PROBLEM — Z51.81 MEDICATION MONITORING ENCOUNTER: Status: ACTIVE | Noted: 2025-04-28

## 2025-04-28 NOTE — ASSESSMENT & PLAN NOTE
Counseled on risk that Meloxicam can increase Lithium levels, so would like to check her levels to make sure this is not the cause of her tremors.     Tremors seem to be more related to when she is anxious and have not changed since starting Meloxicam. Would expect them to be all of the time if related to medication interactions.     Recommend to discuss with psychiatrist.

## 2025-04-28 NOTE — ASSESSMENT & PLAN NOTE
Continue Meloxicam for now.  Continue PT.   Will trial Robaxin as needed for muscle spasms.   Counseled on potential side effect of somnolence with Robaxin. Do not operate heavy machinery or cars while taking.   Encourage use of heat on low back.  Patient to notify myself or PCP if symptoms worsening or not improving.

## 2025-05-08 ENCOUNTER — TREATMENT (OUTPATIENT)
Dept: PHYSICAL THERAPY | Facility: CLINIC | Age: 35
End: 2025-05-08
Payer: COMMERCIAL

## 2025-05-08 DIAGNOSIS — M54.50 CHRONIC BILATERAL LOW BACK PAIN WITHOUT SCIATICA: Primary | ICD-10-CM

## 2025-05-08 DIAGNOSIS — G89.29 CHRONIC BILATERAL LOW BACK PAIN WITHOUT SCIATICA: Primary | ICD-10-CM

## 2025-05-08 NOTE — PROGRESS NOTES
Physical Therapy Daily Progress Note    Patient: Darrius Katz   : 1990  Diagnosis/ICD-10 Code:  Chronic bilateral low back pain with bilateral sciatica [M54.42, M54.41, G89.29]  Referring practitioner: No ref. provider found  Date of Initial Visit: Type: THERAPY  Noted: 3/6/2025  Today's Date: 2025  Patient seen for 5 sessions         Darrius Katz reports that her low back feels more tight.  Bent over to lift something in a RDL motion that aggravated her low back.  Notes that she is slightly better as she is able to sleep on her sides.  Less pain and stiffness after a full shift of work without use of Meloxicam.  Denies pain, numbness and tingling in both legs.    Aggs:  -RDLs  -squatting with load    Subjective     Objective   See Exercise, Manual, and Modality Logs for complete treatment.     *TIKA:  16% (IE: 24%)    Assessment/Plan  Ms. Katz has attended physical therapy for a total of 5 visits for chronic low back pain.  TIKA score improved by 4 points/8%.  Has improvement in pain and function during and after work shifts.  Sleeping better at night.  Still needs focused care on improving proximal hip and trunk strength.  Will continue physical therapy 1 visit every 2 weeks for 4 weeks.         Manual Therapy:         mins  45615;  Therapeutic Exercise:    24     mins  44199;     Neuromuscular Jeni:    8    mins  29655;    Therapeutic Activity:     9    mins  71580;     Gait Training:           mins  46495;     Ultrasound:         mins  58789;    Electrical Stimulation:         mins  01759 ( );  Dry Needling          mins self-pay    Timed Treatment:   41   mins   Total Treatment:     41   mins    Bryon Lundy, PT  Physical Therapist

## 2025-05-15 ENCOUNTER — OFFICE VISIT (OUTPATIENT)
Age: 35
End: 2025-05-15
Payer: COMMERCIAL

## 2025-05-15 DIAGNOSIS — F31.30 BIPOLAR I DISORDER, MOST RECENT EPISODE DEPRESSED: Primary | ICD-10-CM

## 2025-05-15 NOTE — PROGRESS NOTES
Progress Note     Date: 05/15/2025   Client Name: Darrius Katz   MRN: [id number]   Start Time:    10:04 AM  End Time:    11:03 AM    Diagnoses and all orders for this visit:    1. Bipolar I disorder, most recent episode depressed (Primary)        Active Symptoms/Chief Complainant:   Negative self-talk, anxious thought patterns, anhedonia, excessive worry         MENTAL STATUS EXAM   General Appearance:  Cleanly groomed and dressed  Eye Contact:  Good eye contact  Attitude:  Cooperative  Motor Activity:  Normal gait, posture  Speech:  Normal rate, tone, volume  Mood and affect:  Appropriate and mood congruent  Thought Process:  Logical and linear  Associations/ Thought Content:  No delusions  Hallucinations:  None  Suicidal Ideations:  Not present  Homicidal Ideation:  Not present  Sensorium:  Alert and clear  Orientation:  Person, place and time  Fund of Knowledge:  Appropriate for age and educational level  Insight:  Good  Judgement:  Good  Reliability:  Good       Risk to self:  Low  No new reported incidents of SI and/or self harm    Risk to others:  Low  No new reported incidents of HI and/or aggressive behavior    Progress Note Intervention     Progress Note Intervention:     Met with client at [facility] for individual session.     Utilized MI techniques of OARS and providing empathy to explore current stressors and assess sx burden. Clinician continued to build rapport with client during session utilizing motivational interviewing and empathic listening.     Utilized CBT reflective listening techniques in order to process current feelings of anxiety and negative self-talk.  Prompted client to identify related thoughts, feelings, and behaviors using cognitive triangle principle.  Processed connections between current stressors, history of trauma, and internalized beliefs regarding diagnosis.  Utilize CBT cognitive restructuring techniques in order to challenge and reframe automatic  negative thoughts.  Discussed concept of radical acceptance and prompted client to identify current barriers to acceptance.  Encouraged client to take note of any questions regarding diagnosis to review with clinician at next session.      Client response:     Client was receptive to MI and CBT intervention. Client reported since last session with clinician continued feelings of anxiety and negative self-talk thought patterns.  Client engaged cognitive triangle principle by identifying related thoughts, feelings, and behaviors.  Client began processing current stressors, history of trauma, and internalized beliefs are connected.  Client engaged cognitive restructuring techniques in order to challenge and reframe automatic negative thoughts.  Client verbalized difficulty accepting diagnosis of Bipolar I Disorder and began discussing concept of radical acceptance.  Client identified current barriers to acceptance and expressed a desire for further psychoeducation on current diagnosis.    Client engaged in active discussion with therapist and appeared receptive to therapeutic intervention/clinician feedback.       Follow-up:    At next session, clinician will continue CBT intervention with an emphasis on psychoeducation about Bipolar I Disorder.    Client reported making some progress on current care plan.  Ongoing treatment is still needed for this client due to goals not being fully realized or symptomology not being resolved.         [facility]

## 2025-05-19 DIAGNOSIS — M62.830 SPASM OF MUSCLE OF LOWER BACK: ICD-10-CM

## 2025-05-19 DIAGNOSIS — M54.41 CHRONIC LEFT-SIDED LOW BACK PAIN WITH BILATERAL SCIATICA: ICD-10-CM

## 2025-05-19 DIAGNOSIS — M54.42 CHRONIC LEFT-SIDED LOW BACK PAIN WITH BILATERAL SCIATICA: ICD-10-CM

## 2025-05-19 DIAGNOSIS — G89.29 CHRONIC LEFT-SIDED LOW BACK PAIN WITH BILATERAL SCIATICA: ICD-10-CM

## 2025-05-19 RX ORDER — METHOCARBAMOL 500 MG/1
TABLET, FILM COATED ORAL
Qty: 30 TABLET | Refills: 0 | Status: SHIPPED | OUTPATIENT
Start: 2025-05-19

## 2025-05-29 ENCOUNTER — OFFICE VISIT (OUTPATIENT)
Age: 35
End: 2025-05-29
Payer: COMMERCIAL

## 2025-05-29 DIAGNOSIS — F31.30 BIPOLAR I DISORDER, MOST RECENT EPISODE DEPRESSED: Primary | ICD-10-CM

## 2025-05-29 NOTE — PROGRESS NOTES
Progress Note     Date: 05/29/2025   Client Name: Darrius Katz   MRN: [id number]   Start Time:    10:07 AM  End Time:    11:06 AM    Diagnoses and all orders for this visit:    1. Bipolar I disorder, most recent episode depressed (Primary)        Active Symptoms/Chief Complainant:   Negative self-talk, anxious thought patterns, anhedonia, excessive worry         MENTAL STATUS EXAM   General Appearance:  Cleanly groomed and dressed  Eye Contact:  Good eye contact  Attitude:  Cooperative  Motor Activity:  Normal gait, posture  Speech:  Normal rate, tone, volume  Mood and affect:  Appropriate and mood congruent  Thought Process:  Logical and linear  Associations/ Thought Content:  No delusions  Hallucinations:  None  Suicidal Ideations:  Not present  Homicidal Ideation:  Not present  Sensorium:  Alert and clear  Orientation:  Person, place and time  Fund of Knowledge:  Appropriate for age and educational level  Insight:  Good  Judgement:  Good  Reliability:  Good       Risk to self:  Low  No new reported incidents of SI and/or self-harm    Risk to others:  Low  No new reported incidents of HI and/or aggressive behavior.    Progress Note Intervention     Progress Note Intervention:     Met with client at [facility] for individual session.     Utilized MI techniques of OARS and providing empathy to explore current stressors and assess sx burden. Clinician continued to build rapport with client during session utilizing motivational interviewing and empathic listening.     Utilized CBT reflective listening techniques in order to process anxious thought patterns and difficulty finding acceptance regarding diagnosis of bipolar disorder.  Provided psychoeducation on Bipolar I Disorder and prompted client to identify parallels in her experience with Bipolar I Disorder.  Continued processing emotional acceptance of new diagnosis and prompted client to identify related thoughts, feelings, and behaviors  using cognitive triangle model.      Client response:     Client was receptive to MI and CBT intervention.  Client continued processing difficulty finding and emotional acceptance regarding diagnosis of Bipolar I Disorder.  Client expressed understanding of psychoeducation provided by clinician and was able to identify parallels in her own personal experience.  Client expressed intellectual understanding of diagnosis, but verbalized difficulty finding emotional acceptance.  Client identified related thoughts, feelings, and behaviors using cognitive triangle model.  Client agreed to incorporate gratitude journal and mood log into daily routine.    Client engaged in active discussion with therapist and appeared receptive to therapeutic intervention/clinician feedback.       Follow-up:    At next session, clinician will continue CBT intervention.    Client reported making some progress on current care plan.  Ongoing treatment is still needed for this client due to goals not being fully realized or symptomology not being resolved.         [facility]

## 2025-06-05 ENCOUNTER — TREATMENT (OUTPATIENT)
Dept: PHYSICAL THERAPY | Facility: CLINIC | Age: 35
End: 2025-06-05
Payer: COMMERCIAL

## 2025-06-05 DIAGNOSIS — M54.50 CHRONIC BILATERAL LOW BACK PAIN WITHOUT SCIATICA: Primary | ICD-10-CM

## 2025-06-05 DIAGNOSIS — G89.29 CHRONIC BILATERAL LOW BACK PAIN WITHOUT SCIATICA: Primary | ICD-10-CM

## 2025-06-05 NOTE — PROGRESS NOTES
Physical Therapy Daily Progress Note    Patient: Darrius Katz   : 1990  Diagnosis/ICD-10 Code:  Chronic bilateral low back pain without sciatica [M54.50, G89.29]  Referring practitioner: Taylor Cope DO  Date of Initial Visit: Type: THERAPY  Noted: 3/6/2025  Today's Date: 2025  Patient seen for 6 sessions         Darrius Katz reports that her low back is not improving significantly.  Notes that she is only doing exercises twice per week, which is noticing is likely to related to not improving significantly.  No no sleep disturbance.  Still having low back pain at end of work shifts and walking.  Painful areas are still is same location.  No longer having pain with bending forward, but still stiff.        Subjective     Objective   See Exercise, Manual, and Modality Logs for complete treatment.     *Hip ext/aBd MMT:  5/5 bailee; 4+/5 bailee    Assessment/Plan  Ms. Katz has attended physical therapy for a total of 6 visits for chronic low back and left hip pain.  Has made improvement in hip strength.  Still has low back and left posterior hip pain with with prolonged standing and walking.  Although, sleep quality is improved.  No longer having pain with trunk flexion.  Focused visit today on improving force output of the lumbopelvic mm, hip strength and trunk strength.  Followed by improving general balance and scapular mm endurance.  Will continue physical therapy 1 visit every 2 weeks for the next 4 weeks           Manual Therapy:         mins  76211;  Therapeutic Exercise:    23     mins  44051;     Neuromuscular Jeni:    8    mins  18747;    Therapeutic Activity:     23     mins  31471;     Gait Training:          mins  54928;     Ultrasound:          mins  67340;    Electrical Stimulation:         mins  18615 ( );  Dry Needling         mins self-pay    Timed Treatment:   54   mins   Total Treatment:     54   mins    Bryon Lundy, PT  Physical Therapist

## 2025-06-16 DIAGNOSIS — M54.41 CHRONIC LEFT-SIDED LOW BACK PAIN WITH BILATERAL SCIATICA: ICD-10-CM

## 2025-06-16 DIAGNOSIS — G89.29 CHRONIC LEFT-SIDED LOW BACK PAIN WITH BILATERAL SCIATICA: ICD-10-CM

## 2025-06-16 DIAGNOSIS — M54.42 CHRONIC LEFT-SIDED LOW BACK PAIN WITH BILATERAL SCIATICA: ICD-10-CM

## 2025-06-16 DIAGNOSIS — M62.830 SPASM OF MUSCLE OF LOWER BACK: ICD-10-CM

## 2025-06-16 RX ORDER — METHOCARBAMOL 500 MG/1
TABLET, FILM COATED ORAL
Qty: 90 TABLET | Refills: 0 | Status: SHIPPED | OUTPATIENT
Start: 2025-06-16 | End: 2025-06-19

## 2025-06-19 ENCOUNTER — LAB (OUTPATIENT)
Dept: LAB | Facility: HOSPITAL | Age: 35
End: 2025-06-19
Payer: COMMERCIAL

## 2025-06-19 ENCOUNTER — OFFICE VISIT (OUTPATIENT)
Dept: FAMILY MEDICINE CLINIC | Facility: CLINIC | Age: 35
End: 2025-06-19
Payer: COMMERCIAL

## 2025-06-19 VITALS
OXYGEN SATURATION: 99 % | BODY MASS INDEX: 32.08 KG/M2 | HEART RATE: 79 BPM | HEIGHT: 67 IN | SYSTOLIC BLOOD PRESSURE: 124 MMHG | DIASTOLIC BLOOD PRESSURE: 82 MMHG | WEIGHT: 204.4 LBS

## 2025-06-19 DIAGNOSIS — E78.00 PURE HYPERCHOLESTEROLEMIA: ICD-10-CM

## 2025-06-19 DIAGNOSIS — E66.811 CLASS 1 OBESITY WITHOUT SERIOUS COMORBIDITY WITH BODY MASS INDEX (BMI) OF 32.0 TO 32.9 IN ADULT, UNSPECIFIED OBESITY TYPE: Primary | ICD-10-CM

## 2025-06-19 DIAGNOSIS — G89.29 CHRONIC LEFT-SIDED LOW BACK PAIN WITH RIGHT-SIDED SCIATICA: ICD-10-CM

## 2025-06-19 DIAGNOSIS — M54.41 CHRONIC LEFT-SIDED LOW BACK PAIN WITH RIGHT-SIDED SCIATICA: ICD-10-CM

## 2025-06-19 DIAGNOSIS — M62.830 SPASM OF MUSCLE OF LOWER BACK: ICD-10-CM

## 2025-06-19 LAB
CHOLEST SERPL-MCNC: 148 MG/DL (ref 0–200)
HDLC SERPL QL: 4.48
HDLC SERPL-MCNC: 33 MG/DL (ref 40–60)
LDLC SERPL CALC-MCNC: 100 MG/DL (ref 0–100)
TRIGL SERPL-MCNC: 74 MG/DL (ref 0–150)
VLDLC SERPL-MCNC: 15 MG/DL (ref 5–40)

## 2025-06-19 PROCEDURE — 80061 LIPID PANEL: CPT

## 2025-06-19 PROCEDURE — 99214 OFFICE O/P EST MOD 30 MIN: CPT | Performed by: STUDENT IN AN ORGANIZED HEALTH CARE EDUCATION/TRAINING PROGRAM

## 2025-06-19 RX ORDER — PROPRANOLOL HYDROCHLORIDE 10 MG/1
TABLET ORAL
COMMUNITY
Start: 2025-05-15 | End: 2025-06-19

## 2025-06-19 RX ORDER — PROPRANOLOL HCL 20 MG
TABLET ORAL
COMMUNITY
Start: 2025-05-29

## 2025-06-19 RX ORDER — TIZANIDINE HYDROCHLORIDE 4 MG/1
4 CAPSULE, GELATIN COATED ORAL 3 TIMES DAILY PRN
Qty: 30 CAPSULE | Refills: 1 | Status: SHIPPED | OUTPATIENT
Start: 2025-06-19

## 2025-06-19 NOTE — PROGRESS NOTES
Established Office Visit      Patient Name: Darrius Katz  : 1990   MRN: [id number]   Care Team: Patient Care Team:  Taylor Cope DO as PCP - General (Internal Medicine)    Chief Complaint:    Chief Complaint   Patient presents with    Obesity    Hyperlipidemia       History of Present Illness: Darrius Katz is a 35 y.o. female who is here today to follow up with obesity.    Since I have last seen her she has joined an online weight loss program and started compounded Tirzepatide through this program. She reports significant decrease in food noise, portion sizes, feeling julien faster. She has lost 30 lb over the past 4 months. She is staying active around the house but has not yet made it to the gym. Plans on this.     She feels her mobility is better, endurance has improved.     She has been working with PT for chronic back pain for >8 weeks without improvement. Continues to experience bilateral low back pain moreso on left side. Intermittently with right sided sciatica. Does not feel meloxicam or robaxin have been helpful. She reports symptoms interfere with ability to walk  some days. No incontinence. Difficulty bending/lifting due to pain.     Subjective      Review of Systems:   Review of Systems - See HPI    I have reviewed and the following portions of the patient's history were updated as appropriate: past family history, past medical history, past social history, past surgical history and problem list.    Medications:     Current Outpatient Medications:     buPROPion XL (WELLBUTRIN XL) 300 MG 24 hr tablet, , Disp: , Rfl:     Angy 0.35 MG tablet, Take 1 tablet by mouth Daily., Disp: , Rfl:     lithium 300 MG tablet, Take 1 tablet by mouth., Disp: , Rfl:     lithium carbonate 150 MG capsule, 1 capsule., Disp: , Rfl:     meloxicam (MOBIC) 15 MG tablet, Take 1 tablet by mouth Daily As Needed for Mild Pain (low back pain with left sciatica)., Disp: 30 tablet, Rfl: 0     "methocarbamol (ROBAXIN) 500 MG tablet, TAKE 1 TABLET BY MOUTH ONCE NIGHTLY AS NEEDED FOR MUSCLE SPASMS, Disp: 90 tablet, Rfl: 0    propranolol (INDERAL) 20 MG tablet, , Disp: , Rfl:     valACYclovir (VALTREX) 500 MG tablet, 1 tablet., Disp: , Rfl:     Allergies:   Allergies   Allergen Reactions    Sulfa Antibiotics Rash       Objective     Physical Exam:  Vital Signs:   Vitals:    06/19/25 0909   BP: 124/82   Pulse: 79   SpO2: 99%   Weight: 92.7 kg (204 lb 6.4 oz)   Height: 170.2 cm (67\")     Body mass index is 32.01 kg/m².     Physical Exam  Vitals reviewed.   Constitutional:       Appearance: Normal appearance. She is not ill-appearing.   Cardiovascular:      Rate and Rhythm: Normal rate.   Pulmonary:      Effort: Pulmonary effort is normal. No respiratory distress.   Skin:     General: Skin is warm and dry.   Neurological:      Mental Status: She is alert.   Psychiatric:         Mood and Affect: Mood normal.         Behavior: Behavior normal.         Judgment: Judgment normal.         Assessment / Plan      Assessment/Plan:   Problems Addressed This Visit  Diagnoses and all orders for this visit:    1. Class 1 obesity without serious comorbidity with body mass index (BMI) of 32.0 to 32.9 in adult, unspecified obesity type (Primary)    2. Chronic left-sided low back pain with right-sided sciatica    3. Spasm of muscle of lower back    4. Pure hypercholesterolemia      Obesity - improving with dietary modifications and current online weight management program including compounded tirzepatide.     Chronic low back pain L>R with right sided sciatica ongoing x 10 years, worsening and unresponsive to PT, NSAID, muscle relaxer >3 months. Will obtain MR L spine and consider pain management pending results.     HLD - repeat lipid panel today    Plan of care reviewed with patient at the conclusion of today's visit. Education was provided regarding diagnosis and management.  Patient verbalizes understanding of and agreement " with management plan.    Follow Up:   Return in about 3 months (around 9/19/2025) for Recheck.        DO ANNY Corley RD  [facility]  [address]  [facility] [address]  Fax [phone]  Phone [phone]

## 2025-06-20 ENCOUNTER — RESULTS FOLLOW-UP (OUTPATIENT)
Dept: FAMILY MEDICINE CLINIC | Facility: CLINIC | Age: 35
End: 2025-06-20
Payer: COMMERCIAL

## 2025-07-10 ENCOUNTER — OFFICE VISIT (OUTPATIENT)
Age: 35
End: 2025-07-10
Payer: COMMERCIAL

## 2025-07-10 DIAGNOSIS — F31.30 BIPOLAR I DISORDER, MOST RECENT EPISODE DEPRESSED: Primary | ICD-10-CM

## 2025-07-10 NOTE — PROGRESS NOTES
Progress Note     Date: 07/10/2025  Client Name: Darrius Katz  MRN: [id number]  Start Time:    11:56 AM    End Time:    1:01 PM      Diagnoses and all orders for this visit:    1. Bipolar I disorder, most recent episode depressed (Primary)         Active Symptoms/Chief Complainant:   Negative self-talk, anxious thought patterns, anhedonia, excessive worry         MENTAL STATUS EXAM   General Appearance:  Cleanly groomed and dressed  Eye Contact:  Good eye contact  Attitude:  Cooperative  Motor Activity:  Normal gait, posture  Speech:  Normal rate, tone, volume  Mood and affect:  Appropriate and mood congruent  Thought Process:  Logical and linear  Associations/ Thought Content:  No delusions  Hallucinations:  None  Suicidal Ideations:  Not present  Homicidal Ideation:  Not present  Sensorium:  Alert and clear  Orientation:  Person, place and time  Fund of Knowledge:  Appropriate for age and educational level  Insight:  Good  Judgement:  Good  Reliability:  Good       Risk to self:  Low  No new reported incidents of SI and/or self harm    Risk others:  Low  No new reported incidents of HI and/or aggressive behaviors    Progress Note Intervention/Client Response     Progress Note Intervention:     Met with client at [facility] for individual session.     Utilized MI techniques of OARS and providing empathy to explore current stressors and assess sx burden. Clinician continued to build rapport with client during session utilizing motivational interviewing and empathic listening.     Utilized CBT reflective listening techniques in order to process depressive symptoms. Prompted client to identify related thoughts, feelings, and behaviors using cognitive triangle model. Provided psychoeducation on depressive symptoms and CBT behavioral activation skill. Explored ways in which client can begin incorporating skill into practice. Utilized CBT cognitive restructuring techniques in order to  challenge and reframe automatic negative thoughts.      Client response:     Client was receptive to MI and CBT intervention.  Client processed current depressive symptoms with clinician and expressed understanding of psychoeducation provided by clinician. Client identified related thoughts, feelings, and behaviors using cognitive triangle model. Client expressed understanding of behavioral activation skill and identified ways to incorporate skill into daily life. Client challenged and reframed automatic negative thoughts using cognitive restructuring techniques.    Client engaged in active discussion with therapist and appeared receptive to therapeutic intervention/clinician feedback.       Follow-up:    At next session, clinician will continue CBT intervention.    Client reported making some progress on current care plan.  Ongoing treatment is still needed for this client due to goals not being fully realized or symptomology not being resolved.         [facility] [address]

## 2025-07-16 DIAGNOSIS — M54.41 CHRONIC LEFT-SIDED LOW BACK PAIN WITH RIGHT-SIDED SCIATICA: ICD-10-CM

## 2025-07-16 DIAGNOSIS — G89.29 CHRONIC LEFT-SIDED LOW BACK PAIN WITH RIGHT-SIDED SCIATICA: ICD-10-CM

## 2025-07-16 DIAGNOSIS — M62.830 SPASM OF MUSCLE OF LOWER BACK: ICD-10-CM

## 2025-07-17 RX ORDER — TIZANIDINE HYDROCHLORIDE 4 MG/1
4 CAPSULE, GELATIN COATED ORAL 3 TIMES DAILY PRN
Qty: 270 CAPSULE | Refills: 0 | Status: SHIPPED | OUTPATIENT
Start: 2025-07-17

## 2025-07-31 ENCOUNTER — OFFICE VISIT (OUTPATIENT)
Age: 35
End: 2025-07-31
Payer: COMMERCIAL

## 2025-07-31 DIAGNOSIS — F31.30 BIPOLAR I DISORDER, MOST RECENT EPISODE DEPRESSED: Primary | ICD-10-CM

## 2025-07-31 NOTE — TREATMENT PLAN
"Multi-Disciplinary Problems (from Behavioral Health Treatment Plan)      Active Problems       Problem: Bipolar Disorder  Start Date: 07/31/25      Problem Details: Problem Statement:     Client is struggling with Negative self-talk, anxious thought patterns, anhedonia, excessive worry     Plan Discharge:    Services will be discontinued upon completion of treatment goals, 45 days without services, 3 No Shows in a 12-month period, or Client report services are no longer needed.          Goal Priority Start Date Expected End Date End Date    Patient will report stabilized mood fluctuations and improvement in overall functioning in daily life (1-7) days out of each week. High 07/31/25 01/29/26 --    Goal Details: Goal:     \" Being more present.\"     Objective:      Over the next 90 days, I will learn and utilize at least 3 coping skills to decrease negative self-talk and cope better with stressors, as measured by Client reports and reductions in the PHQ-9 and SHAHID 7.        Progress toward goal:  Client reported some progress on identified goals from the previous plan of care.  Ongoing treatment is still needed for this client due to newly established goals and client's symptomology not being resolved.         Goal Intervention Frequency Start Date End Date    Assist patient with tracking mood changes using daily journal and practice mindfulness and relaxation strategies like meditation Weekly 07/31/25 --    Intervention Details: Duration of treatment until remission of symptoms.        Goal Intervention Frequency Start Date End Date    Create a safety plan with the patient to help recognize triggers to anger, irritability and interpersonal conflict as well as to help identify positive coping skills and support Weekly 07/31/25 --    Intervention Details: Duration of treatment until remission of symptoms.                        Reviewed By       Randi Bo LCSW 07/31/25 8546                     I have discussed " and reviewed this treatment plan with the patient.

## 2025-07-31 NOTE — PROGRESS NOTES
Progress Note     Date: 07/31/2025  Client Name: Darrius Katz  MRN: [id number]  Start Time:    11 AM  End Time:    12:01 PM     Diagnoses and all orders for this visit:    1. Bipolar I disorder, most recent episode depressed (Primary)         Active Symptoms/Chief Complainant:   Negative self-talk, anxious thought patterns, anhedonia, excessive worry         MENTAL STATUS EXAM   General Appearance:  Cleanly groomed and dressed  Eye Contact:  Good eye contact  Attitude:  Cooperative  Motor Activity:  Normal gait, posture  Speech:  Normal rate, tone, volume  Mood and affect:  Appropriate and mood congruent  Thought Process:  Logical and linear  Associations/ Thought Content:  No delusions  Hallucinations:  None  Suicidal Ideations:  Not present  Homicidal Ideation:  Not present  Sensorium:  Alert and clear  Orientation:  Person, place and time  Fund of Knowledge:  Appropriate for age and educational level  Insight:  Good  Judgement:  Good  Reliability:  Good         PHQ-9  Little interest or pleasure in doing things? Several days   Feeling down, depressed, or hopeless? Several days   PHQ-2 Total Score 2   Trouble falling or staying asleep, or sleeping too much? Several days   Feeling tired or having little energy? More than half the days   Poor appetite or overeating? Several days   Feeling bad about yourself - or that you are a failure or have let yourself or your family down? Several days   Trouble concentrating on things, such as reading the newspaper or watching television? More than half the days   Moving or speaking so slowly that other people could have noticed? Or the opposite - being so fidgety or restless that you have been moving around a lot more than usual? Not at all     Thoughts that you would be better off dead, or of hurting yourself in some way? Not at all   PHQ-9 Total Score 9   If you checked off any problems, how difficult have these problems made it for you to do your work, take  care of things at home, or get along with other people? Somewhat difficult           SHAHID 7  SHAHID-7 Anxiety Screening  Feeling nervous, anxious or on edge? More than half the days   Not being able to stop or control worrying? More than half the days   Worrying too much about different things? More than half the days   Trouble relaxing? More than half the days   Being so restless that it is hard to sit still? More than half the days   Becoming easily annoyed or irritable? Not at all   Feeling afraid as if something awful might happen? Several days   SHAHID-7 Total Score 11   If you checked any problems, how difficult have these problems made it for you to do your work, take care of things at home, or get along with other people? Somewhat difficult         [place] Suicide Severity Rating (C-SSRS)  In the past month, have you wished you were dead or wished you could go to sleep and not wake up? No     In the past month, have you actually had any thoughts of killing yourself? No     Have you been thinking about how you might do this?       Have you had these thoughts and had some intention of acting on them?       Have you started to work out or have you worked out the details of how to kill yourself?       Have you ever in your lifetime done anything, started to do anything, or prepared to do anything to end your life? No       Was this within the past three months?       Level of Risk per Screen No Risk Indicated       Risk to self:  Low  No new reported incidents of SI or self-harm    Risk others:  Low  No new reported incidents of HI or aggressive behavior    Progress Note Intervention/Client Response     Progress Note Intervention:     Met with client at [facility] for individual session.     Utilized MI techniques of OARS and providing empathy to explore current stressors and assess sx burden. Clinician continued to build rapport with client during session utilizing motivational interviewing and empathic  listening. Utilized MI goal setting techniques to establish treatment goals and complete care plan update in collaboration with client.     Utilized CBT reflective listening techniques in order to process use of behavioral activation skill and improvement in overall mood.  Explored what changes have contributed to decrease in anxiety and improvement in mood.  Explored continued areas for growth and discussed previous coping skills used prior to most recent hospitalization.    Encouraged client to complete journaling exercise before next session to discuss with clinician.      Client response:     Client was receptive to MI and CBT intervention. Client was able to identify goals for therapy and was actively engaged in treatment planning process.  Client reported since last session with clinician engaging use of behavioral activation skills and making a change in medication with psychiatric APRN.  Client reported noticing a decrease in anxiety and an improvement in overall mood.  Client discussed contributing factors to these changes and identified continued areas for growth.  Client verbalized feeling like she is making progress towards her future goals and stated that she will be returning to school next semester.  Client discussed previous coping skills used prior to most recent hospitalization and expressed a desire to begin practicing journaling exercise that she used to find beneficial.  Client agreed to complete journaling exercise before next session to discuss with clinician.    Client engaged in active discussion with therapist and appeared receptive to therapeutic intervention/clinician feedback.       Follow-up:    At next session, clinician will continue CBT intervention.    Client reported some progress on identified goals from the previous plan of care.  Ongoing treatment is still needed for this client due to newly established goals and client's symptomology not being resolved.         [facility]  [facility] [address]

## 2025-08-07 ENCOUNTER — OFFICE VISIT (OUTPATIENT)
Age: 35
End: 2025-08-07
Payer: COMMERCIAL

## 2025-08-07 DIAGNOSIS — F31.30 BIPOLAR I DISORDER, MOST RECENT EPISODE DEPRESSED: Primary | ICD-10-CM

## 2025-08-21 ENCOUNTER — OFFICE VISIT (OUTPATIENT)
Age: 35
End: 2025-08-21
Payer: COMMERCIAL

## 2025-08-21 DIAGNOSIS — F31.30 BIPOLAR I DISORDER, MOST RECENT EPISODE DEPRESSED: Primary | ICD-10-CM
